# Patient Record
Sex: MALE | Race: WHITE | NOT HISPANIC OR LATINO | Employment: PART TIME | URBAN - METROPOLITAN AREA
[De-identification: names, ages, dates, MRNs, and addresses within clinical notes are randomized per-mention and may not be internally consistent; named-entity substitution may affect disease eponyms.]

---

## 2017-05-28 ENCOUNTER — HOSPITAL ENCOUNTER (EMERGENCY)
Facility: CLINIC | Age: 52
Discharge: HOME | End: 2017-05-28
Attending: FAMILY MEDICINE | Admitting: FAMILY MEDICINE
Payer: COMMERCIAL

## 2017-05-28 VITALS
RESPIRATION RATE: 20 BRPM | TEMPERATURE: 97.7 F | DIASTOLIC BLOOD PRESSURE: 101 MMHG | OXYGEN SATURATION: 98 % | HEART RATE: 82 BPM | SYSTOLIC BLOOD PRESSURE: 144 MMHG

## 2017-05-28 DIAGNOSIS — Z87.19 PERSONAL HISTORY OF DIGESTIVE DISEASE: ICD-10-CM

## 2017-05-28 DIAGNOSIS — R10.32 ABDOMINAL PAIN, LEFT LOWER QUADRANT: ICD-10-CM

## 2017-05-28 DIAGNOSIS — Z87.19 HISTORY OF DIVERTICULITIS: ICD-10-CM

## 2017-05-28 PROCEDURE — 99282 EMERGENCY DEPT VISIT SF MDM: CPT | Performed by: FAMILY MEDICINE

## 2017-05-28 PROCEDURE — 99284 EMERGENCY DEPT VISIT MOD MDM: CPT | Mod: Z6 | Performed by: FAMILY MEDICINE

## 2017-05-28 RX ORDER — CIPROFLOXACIN 500 MG/1
500 TABLET, FILM COATED ORAL 2 TIMES DAILY
Qty: 20 TABLET | Refills: 0 | Status: SHIPPED | OUTPATIENT
Start: 2017-05-28 | End: 2017-06-07

## 2017-05-28 RX ORDER — METRONIDAZOLE 500 MG/1
500 TABLET ORAL 3 TIMES DAILY
Qty: 30 TABLET | Refills: 0 | Status: SHIPPED | OUTPATIENT
Start: 2017-05-28 | End: 2017-06-07

## 2017-05-28 NOTE — ED AVS SNAPSHOT
[facility] Emergency Department    [address] [place] [address]    Phone:  [phone]    Fax:  [phone]                                       Nehemias Dillon   MRN: [id number]    Department:  [facility] Emergency Department   Date of Visit:  5/28/2017           Patient Information     Date Of Birth          1965        Your diagnoses for this visit were:     Abdominal pain, left lower quadrant     History of diverticulitis        You were seen by Trevor Landis MD.      Follow-up Information     Follow up with Luke Rodriguez MD In 3 days.    Specialty:  Family Practice    Why:  if not improving    Contact information:     [facility]  [address] [place] [address] [phone]          Follow up with [facility] Emergency Department.    Specialty:  EMERGENCY MEDICINE    Why:  If symptoms worsen    Contact information:    [address] [place] [address] [phone]    Additional information:    From [address]: Exit at [address] on [place] side of [place]. Turn right on [address]. Turn left at stoplight on [address]. [facility] will be in view two blocks ahead        Discharge Instructions       Thank you for giving us the opportunity to see you. The impression we have from your visit today is that you likely have acute recurrent diverticulitis.  Please see the handout below.    Take Flagyl 500 mg 3 times a day for 10 days, and Cipro 500 mg twice a day for 10 days.    Eat a low fiber/low residue diet.     If you are not seeing an improvement within 2-3 days, please follow up with your primary care provider or clinic.     After discharge, please closely monitor for any new or worsening symptoms. Return to the Emergency Department at any time if your symptoms worsen.        Discharge References/Attachments     DIVERTICULITIS, DISCHARGE INSTRUCTIONS FOR (ENGLISH)      24 Hour Appointment Hotline       To  make an appointment at any [facility] clinic, call [phone] ([phone]). If you don't have a family doctor or clinic, we will help you find one. [facility] clinics are conveniently located to serve the needs of you and your family.             Review of your medicines      START taking        Dose / Directions Last dose taken    ciprofloxacin 500 MG tablet   Commonly known as:  CIPRO   Dose:  500 mg   Quantity:  20 tablet        Take 1 tablet (500 mg) by mouth 2 times daily for 10 days   Refills:  0        metroNIDAZOLE 500 MG tablet   Commonly known as:  FLAGYL   Dose:  500 mg   Quantity:  30 tablet        Take 1 tablet (500 mg) by mouth 3 times daily for 10 days   Refills:  0          Our records show that you are taking the medicines listed below. If these are incorrect, please call your family doctor or clinic.        Dose / Directions Last dose taken    acetaminophen 325 MG tablet   Commonly known as:  TYLENOL   Dose:  650 mg   Quantity:  100 tablet        Take 2 tablets (650 mg) by mouth every 4 hours as needed for mild pain   Refills:  0        ALPRAZolam 0.5 MG tablet   Commonly known as:  XANAX   Dose:  0.5-1 mg   Quantity:  4 tablet        Take 1-2 tablets (0.5-1 mg) by mouth 2 times daily as needed for anxiety   Refills:  1        atorvastatin 20 MG tablet   Commonly known as:  LIPITOR   Dose:  20 mg   Quantity:  30 tablet        Take 1 tablet (20 mg) by mouth daily   Refills:  5        buPROPion 150 MG 12 hr tablet   Commonly known as:  WELLBUTRIN SR   Quantity:  60 tablet        Take 1 tablet once daily and increase to 1 tablet twice daily after 4 to 7 days   Refills:  2        EPINEPHrine 0.3 MG/0.3ML injection   Dose:  0.3 mg   Quantity:  0.6 mL        Inject 0.3 mLs (0.3 mg) into the muscle once as needed for anaphylaxis   Refills:  1        hydrOXYzine 25 MG tablet   Commonly known as:  ATARAX   Dose:  25-50 mg   Quantity:  60 tablet        Take 1-2 tablets (25-50 mg) by mouth every 6 hours  as needed for itching   Refills:  1        penicillin V potassium 500 MG tablet   Commonly known as:  VEETID   Dose:  500 mg   Quantity:  40 tablet        Take 1 tablet (500 mg) by mouth 4 times daily   Refills:  0        sertraline 50 MG tablet   Commonly known as:  ZOLOFT   Dose:  50 mg   Quantity:  90 tablet        Take 1 tablet (50 mg) by mouth daily   Refills:  3                Prescriptions were sent or printed at these locations (2 Prescriptions)                   [facility]   [address] [address]    Telephone:  [phone]   Fax:  [phone]   Hours:                  Printed at Department/Unit printer (1 of 2)         metroNIDAZOLE (FLAGYL) 500 MG tablet                 These medications are not ready yet because we are checking if your insurance will help you pay for them. Call your pharmacy to confirm that your medication is ready for pickup. It may take up to 24 hours for them to receive the prescription. If the prescription is not ready within 3 business days, please contact your clinic or your provider (1 of 2)         ciprofloxacin (CIPRO) 500 MG tablet                Orders Needing Specimen Collection     None      Pending Results     No orders found from 5/26/2017 to 5/29/2017.            Pending Culture Results     No orders found from 5/26/2017 to 5/29/2017.            Pending Results Instructions     If you had any lab results that were not finalized at the time of your Discharge, you can call the ED Lab Result RN at [phone]. You will be contacted by this team for any positive Lab results or changes in treatment. The nurses are available 7 days a week from 10A to 6:30P.  You can leave a message 24 hours per day and they will return your call.        Thank you for choosing [facility]       Thank you for choosing [facility] for your care. Our goal is always to provide you with excellent care. Hearing back from our patients is one way we can  continue to improve our services. Please take a few minutes to complete the written survey that you may receive in the mail after you visit with us. Thank you!        [facility] Information     [facility] gives you secure access to your electronic health record. If you see a primary care provider, you can also send messages to your care team and make appointments. If you have questions, please call your primary care clinic.  If you do not have a primary care provider, please call [phone] and they will assist you.        Care EveryWhere ID     This is your Care EveryWhere ID. This could be used by other organizations to access your [place] medical records  [id number]        After Visit Summary       This is your record. Keep this with you and show to your community pharmacist(s) and doctor(s) at your next visit.

## 2017-05-28 NOTE — ED AVS SNAPSHOT
[facility] Emergency Department    [address] [place] [address]    Phone:  [phone]    Fax:  [phone]                                       Nehemias Dillon   MRN: [id number]    Department:  [facility] Emergency Department   Date of Visit:  5/28/2017           After Visit Summary Signature Page     I have received my discharge instructions, and my questions have been answered. I have discussed any challenges I see with this plan with the nurse or doctor.    ..........................................................................................................................................  Patient/Patient Representative Signature      ..........................................................................................................................................  Patient Representative Print Name and Relationship to Patient    ..................................................               ................................................  Date                                            Time    ..........................................................................................................................................  Reviewed by Signature/Title    ...................................................              ..............................................  Date                                                            Time

## 2017-05-29 NOTE — DISCHARGE INSTRUCTIONS
Thank you for giving us the opportunity to see you. The impression we have from your visit today is that you likely have acute recurrent diverticulitis.  Please see the handout below.    Take Flagyl 500 mg 3 times a day for 10 days, and Cipro 500 mg twice a day for 10 days.    Eat a low fiber/low residue diet.     If you are not seeing an improvement within 2-3 days, please follow up with your primary care provider or clinic.     After discharge, please closely monitor for any new or worsening symptoms. Return to the Emergency Department at any time if your symptoms worsen.

## 2017-05-29 NOTE — ED PROVIDER NOTES
"                                                            [facility] ED Provider Note   CC:     Chief Complaint   Patient presents with     Abdominal Pain     HPI:  Nehemias Dillon is a 52 year old male who presented to the emergency department with wife for abdominal pain. Patient has a history of diverticulitis. He had 12.5 inches of his colon removed. The pain is only located on the left side and has been present since Wednesday. Rates the pain a 4/10 while being in the ED and a 10/10 while he is trying to have a bowel movement. Laying down makes the pain better, but he still has pain while sitting down. It has been steadily worsening since Wednesday. He has only had two episodes of this severe pain since surgery which was in 2014. He can tell when he is going to get backed up. He had more bowel movements today and was relieved after one of them; but knew it was probably because he had an infection. He states that it was dark on one side and \"ribbon-like\" on the other side. He has been eating more fruit which believes is why he has an excess of gas. Last night he was running a low-grade fever. He's very understanding and knows what to do when he is in this amount of pain. Has had plenty of fluids. Would like a 10 day antibiotic prescription since he has had good results with that in the past.    Problem List:  Patient Active Problem List    Diagnosis     Diverticulitis of sigmoid colon     Acute chest pain     Tobacco use disorder     Inguinal hernia     Anxiety     Hidradenitis suppurativa     Suppurative hidradenitis     Diverticulitis of colon     Hyperlipidemia LDL goal <130     Allergic to bees     Obstructive sleep apnea     Carpal tunnel syndrome     Sleep disorder     Obesity     Pure hyperglyceridemia     Other and unspecified derangement of medial meniscus       MEDS:   Discharge Medication List as of 5/28/2017  9:48 PM      CONTINUE these medications which have NOT CHANGED    Details "   atorvastatin (LIPITOR) 20 MG tablet Take 1 tablet (20 mg) by mouth daily, Disp-30 tablet, R-5, Fax      penicillin V potassium (VEETID) 500 MG tablet Take 1 tablet (500 mg) by mouth 4 times daily, Disp-40 tablet, R-0, E-Prescribe      sertraline (ZOLOFT) 50 MG tablet Take 1 tablet (50 mg) by mouth daily, Disp-90 tablet, R-3, E-Prescribe      ALPRAZolam (XANAX) 0.5 MG tablet Take 1-2 tablets (0.5-1 mg) by mouth 2 times daily as needed for anxiety, Disp-4 tablet, R-1, Local Print      acetaminophen (TYLENOL) 325 MG tablet Take 2 tablets (650 mg) by mouth every 4 hours as needed for mild pain, Disp-100 tablet, Historical      buPROPion (WELLBUTRIN SR) 150 MG 12 hr tablet Take 1 tablet once daily and increase to 1 tablet twice daily after 4 to 7 days, Disp-60 tablet, R-2, Fax      hydrOXYzine (ATARAX) 25 MG tablet Take 1-2 tablets (25-50 mg) by mouth every 6 hours as needed for itching, Disp-60 tablet, R-1, E-Prescribe      EPINEPHrine (EPIPEN) 0.3 MG/0.3ML injection Inject 0.3 mLs (0.3 mg) into the muscle once as needed for anaphylaxis, Disp-0.6 mL, R-1, E-Prescribe             ALLERGIES:    Allergies   Allergen Reactions     Bees [Bees]      Nkda [No Known Drug Allergies]        Past medical, surgical, and social histories, triage and nursing notes were all reviewed.    Review of Systems   All other systems were reviewed and are negative    Physical Exam     Vitals were reviewed  Patient Vitals for the past 8 hrs:   BP Temp Temp src Pulse Resp SpO2   05/28/17 2120 (!) 144/101 97.7  F (36.5  C) Temporal 82 20 98 %     GENERAL APPEARANCE: calm, no acute distress, pleasant male  FACE: normal facies  EYES: Pupils are equal  HENT: normal external exam  NECK: no adenopathy or asymmetry  RESP: normal respiratory effort; clear breath sounds bilaterally  CV: regular rate and rhythm; no significant murmurs, gallops or rubs; DP and PT pulses +2  ABD: LLQ tenderness; soft, no rebound or guarding; bowel sounds are normal  MS: no  gross deformities noted; normal muscle tone.  EXT: No calf tenderness or pitting edema  SKIN: no worrisome rash  NEURO: no facial droop; no focal deficits, speech is normal        Available Lab/Imaging Results   No results found for this or any previous visit (from the past 24 hour(s)).      Impression     Final diagnoses:   Abdominal pain, left lower quadrant   History of diverticulitis       ED Course & Medical Decision Making   Nehemias Dillon is a 52 year old male who presented to the emergency department with onset of left lower quadrant abdominal pain since Wednesday, approximately 5 days ago.  Initially the patient started to take more fiber since he thought he was constipated.  In the past when he has had acute diverticulitis, symptoms 1st began with the constipation.  He started to eat lots of fruits, and started to have a bowel movement last night and today.  His pain was worse today, and it is more intense after having a bowel movement.  He has had several more bowel movements today that are abnormal in shape.  He describes a ribbon-like shape and flat small stools.  There is been no blood although there has been some stools that are dark in color.  He has had diverticulitis even since he had a partial resection 3-4 years ago for diverticulitis.  He does not identify any triggers.  Patient reports low-grade temp of 99.3 the last 2 nights.  He does not have chills, night sweats and does not report any nausea or vomiting.  He is still eating a regular diet.  Patient is accompanied by his wife.  Patient was offered the routine workup including CBC and CT scan.  He states that he is 100% certain that he has a recurrent episode of acute diverticulitis.  Patient would prefer to start antibiotics and promises to return in 2-3 days if he is not better.  He was instructed to begin a low residue diet.  He did not want any pain medication.  Follow-up or return in 2-3 days if not improving, and sooner if symptoms  worsen.      Written after-visit summary and instructions were given at the time of discharge.      Discharge Medication List as of 5/28/2017  9:48 PM      START taking these medications    Details   metroNIDAZOLE (FLAGYL) 500 MG tablet Take 1 tablet (500 mg) by mouth 3 times daily for 10 days, Disp-30 tablet, R-0, Local Print      ciprofloxacin (CIPRO) 500 MG tablet Take 1 tablet (500 mg) by mouth 2 times daily for 10 days, Disp-20 tablet, R-0, E-Prescribe           This document serves as a record of services personally performed by Treovr Landis MD. It was created on their behalf by Constance Oliveira, a trained medical scribe. The creation of this record is based on the provider's personal observations and the statements of the patient. This document has been checked and approved by the attending provider.        This note was completed in part using Dragon voice recognition, and may contain word and grammatical errors.        Trevor Landis MD  05/28/17 0580

## 2017-06-11 DIAGNOSIS — E78.5 HYPERLIPIDEMIA LDL GOAL <130: ICD-10-CM

## 2017-06-12 NOTE — TELEPHONE ENCOUNTER
atorvastatin (LIPITOR) 20 MG tablet     Last Written Prescription Date: 11/29/16  Last Fill Quantity: 30, # refills: 5  Last Office Visit with [facility], P or [facility] prescribing provider: 10/3/16 OFELIA       Lab Results   Component Value Date    CHOL 130 09/06/2016     Lab Results   Component Value Date    HDL 24 09/06/2016     Lab Results   Component Value Date    LDL 67 09/06/2016     Lab Results   Component Value Date    TRIG 197 09/06/2016     Lab Results   Component Value Date    CHOLHDLRATIO 7.0 06/15/2012

## 2017-06-13 RX ORDER — ATORVASTATIN CALCIUM 20 MG/1
TABLET, FILM COATED ORAL
Qty: 30 TABLET | Refills: 2 | Status: SHIPPED | OUTPATIENT
Start: 2017-06-13 | End: 2017-10-31

## 2017-06-13 NOTE — TELEPHONE ENCOUNTER
Prescription approved per [facility] Refill Protocol.  Dianna Griffiths, RN, BSN

## 2017-10-27 NOTE — PROGRESS NOTES
SUBJECTIVE:   CC: Nehemias Dillon is an 52 year old male who presents for preventative health visit.     Physical   Annual:     Getting at least 3 servings of Calcium per day::  Yes    Bi-annual eye exam::  Yes    Dental care twice a year::  Yes    Sleep apnea or symptoms of sleep apnea::  Sleep apnea    Diet::  Regular (no restrictions)    Frequency of exercise::  None    Taking medications regularly::  Yes    Medication side effects::  None    Additional concerns today::  YES (General surg referral, Derm referral)              Today's PHQ-2 Score: PHQ-2 ( 1999 Pfizer) 10/3/2016   Q1: Little interest or pleasure in doing things 0   Q2: Feeling down, depressed or hopeless 0   PHQ-2 Score 0       Abuse: Current or Past(Physical, Sexual or Emotional)- No  Do you feel safe in your environment - Yes    Social History   Substance Use Topics     Smoking status: Current Every Day Smoker     Packs/day: 1.50     Years: 20.00     Types: Cigarettes     Smokeless tobacco: Never Used      Comment: smokes 1 pack daily again since 6-09     Alcohol use Yes      Comment: one drink rarely     The patient does not drink >3 drinks per day nor >7 drinks per week.    Last PSA: No results found for: PSA    Reviewed orders with patient. Reviewed health maintenance and updated orders accordingly - Yes  BP Readings from Last 3 Encounters:   10/31/17 136/80   05/28/17 (!) 144/101   11/22/16 141/86    Wt Readings from Last 3 Encounters:   10/31/17 252 lb (114.3 kg)   10/03/16 228 lb 6.4 oz (103.6 kg)   09/06/16 232 lb 3.2 oz (105.3 kg)                  Patient Active Problem List   Diagnosis     Other and unspecified derangement of medial meniscus     Pure hyperglyceridemia     Carpal tunnel syndrome     Sleep disorder     Obesity     Obstructive sleep apnea     Allergic to bees     Anxiety     Hyperlipidemia LDL goal <130     Diverticulitis of sigmoid colon     Inguinal hernia     Diverticulitis of colon     Hidradenitis suppurativa      Suppurative hidradenitis     Acute chest pain     Tobacco use disorder     History of diverticulosis     External hemorrhoids     H/O partial resection of colon     Sebaceous cyst     TORI (obstructive sleep apnea)     Inguinal hernia, left     Seasonal allergic rhinitis, unspecified chronicity, unspecified trigger     Past Surgical History:   Procedure Laterality Date     ARTHROPLASTY SHOULDER      right     CARPAL TUNNEL RELEASE RT/LT  2012    left     HC KNEE SCOPE,MED/LAT MENISECTOMY  07/08/2002    Right knee arthroscopy, lateral menisectomy     HC REPAIR ING HERNIA,5+Y/O,REDUCIBL  1997    Marlex mesh repair of right indirect inguinal hernia     SIGMOIDECTOMY         Social History   Substance Use Topics     Smoking status: Current Every Day Smoker     Packs/day: 1.50     Years: 20.00     Types: Cigarettes     Smokeless tobacco: Never Used      Comment: smokes 1 pack daily again since 6-09     Alcohol use Yes      Comment: one drink rarely     Family History   Problem Relation Age of Onset     Adopted: Yes     Unknown/Adopted Mother      Unknown/Adopted Father      Unknown/Adopted Maternal Grandmother      Unknown/Adopted Maternal Grandfather      Unknown/Adopted Paternal Grandmother      Unknown/Adopted Paternal Grandfather      Unknown/Adopted Brother      Unknown/Adopted Sister          Current Outpatient Prescriptions   Medication Sig Dispense Refill     atorvastatin (LIPITOR) 20 MG tablet Take 1 tablet (20 mg) by mouth daily 90 tablet 1     hydrOXYzine (ATARAX) 25 MG tablet Take 1-2 tablets (25-50 mg) by mouth every 6 hours as needed for anxiety 60 tablet 1     cetirizine (ZYRTEC) 10 MG tablet Take 1 tablet (10 mg) by mouth every evening 90 tablet 1     fluticasone (FLONASE) 50 MCG/ACT spray Spray 1-2 sprays into both nostrils daily 3 Bottle 1     EPINEPHrine (EPIPEN/ADRENACLICK/OR ANY BX GENERIC EQUIV) 0.3 MG/0.3ML injection 2-pack Inject 0.3 mLs (0.3 mg) into the muscle once as needed for anaphylaxis 0.6  mL 1     [DISCONTINUED] atorvastatin (LIPITOR) 20 MG tablet TAKE ONE TABLET BY MOUTH EVERY DAY (Patient not taking: Reported on 10/31/2017) 30 tablet 2     penicillin V potassium (VEETID) 500 MG tablet Take 1 tablet (500 mg) by mouth 4 times daily (Patient not taking: Reported on 10/31/2017) 40 tablet 0     sertraline (ZOLOFT) 50 MG tablet Take 1 tablet (50 mg) by mouth daily (Patient not taking: Reported on 10/31/2017) 90 tablet 3     acetaminophen (TYLENOL) 325 MG tablet Take 2 tablets (650 mg) by mouth every 4 hours as needed for mild pain 100 tablet      buPROPion (WELLBUTRIN SR) 150 MG 12 hr tablet Take 1 tablet once daily and increase to 1 tablet twice daily after 4 to 7 days (Patient not taking: Reported on 10/31/2017) 60 tablet 2     Allergies   Allergen Reactions     Bees [Bees]      Nkda [No Known Drug Allergies]            Reviewed and updated as needed this visit by clinical staff         Reviewed and updated as needed this visit by Provider        Past Medical History:   Diagnosis Date     Diverticulitis      DM type 2 (diabetes mellitus, type 2) (H)     Diet resolved DM and diagnosis eliminated from problem list  3/2013     Hypertriglyceridemia      Lumbago     Hx of injury with low back pain & radiculitis, resolved with conservative therapy     TORI (obstructive sleep apnea) 10/31/2017     Prostatitis, unspecified      Unspecified closed fracture of ankle     Ankle fracture      Past Surgical History:   Procedure Laterality Date     ARTHROPLASTY SHOULDER      right     CARPAL TUNNEL RELEASE RT/LT  2012    left     HC KNEE SCOPE,MED/LAT MENISECTOMY  07/08/2002    Right knee arthroscopy, lateral menisectomy     HC REPAIR ING HERNIA,5+Y/O,REDUCIBL  1997    Marlex mesh repair of right indirect inguinal hernia     SIGMOIDECTOMY     Review of Systems  C: NEGATIVE for fever, chills, change in weight  I: NEGATIVE for worrisome rashes, moles or lesions  E: NEGATIVE for vision changes or irritation  ENT:  NEGATIVE for ear, mouth and throat problems  R: NEGATIVE for significant cough or SOB  CV: NEGATIVE for chest pain, palpitations or peripheral edema  GI: NEGATIVE for nausea, abdominal pain, heartburn, or change in bowel habits   male: negative for dysuria, hematuria, decreased urinary stream, erectile dysfunction, urethral discharge  M: NEGATIVE for significant arthralgias or myalgia  N: NEGATIVE for weakness, dizziness or paresthesias  P: NEGATIVE for changes in mood or affect    OBJECTIVE:   There were no vitals taken for this visit.    Physical Exam  GENERAL: healthy, alert and no distress  EYES: Eyes grossly normal to inspection, PERRL and conjunctivae and sclerae normal  HENT: ear canals and TM's normal, nose and mouth without ulcers or lesions  NECK: no adenopathy, no asymmetry, masses, or scars and thyroid normal to palpation  RESP: lungs clear to auscultation - no rales, rhonchi or wheezes  CV: regular rate and rhythm, normal S1 S2, no S3 or S4, no murmur, click or rub, no peripheral edema and peripheral pulses strong  ABDOMEN: soft, nontender, no hepatosplenomegaly, no masses and bowel sounds normal  MS: no gross musculoskeletal defects noted, no edema  SKIN: Evidence of hidradenitis suprapubic to the posterior aspect of the neck is noted today. Several small sebaceous cysts are present upon exam.  NEURO: Normal strength and tone, mentation intact and speech normal  PSYCH: mentation appears normal, affect normal/bright  GENITALIA: - Exam was declined by the patient today. His overall report is that of a left-sided inguinal hernia that comes and goes. He states that he has some external hemorrhoids that he would like looked at by the surgeon.    ASSESSMENT/PLAN:   1. Hyperlipidemia LDL goal <130  - atorvastatin (LIPITOR) 20 MG tablet; Take 1 tablet (20 mg) by mouth daily  Dispense: 90 tablet; Refill: 1    2. Hidradenitis suppurativa  no    3. Sebaceous cyst  - GENERAL SURG ADULT REFERRAL    4. External  hemorrhoids  - GENERAL SURG ADULT REFERRAL    5. Routine general medical examination at a health care facility  - CBC with platelets  - Comprehensive metabolic panel  - Lipid panel reflex to direct LDL Fasting  - PSA, screen  - GENERAL SURG ADULT REFERRAL  - Hemoglobin A1c    6. H/O partial resection of colon  - Hemoglobin A1c    7. History of diverticulosis  - Hemoglobin A1c    8. Anxiety  - hydrOXYzine (ATARAX) 25 MG tablet; Take 1-2 tablets (25-50 mg) by mouth every 6 hours as needed for anxiety  Dispense: 60 tablet; Refill: 1    9. Inguinal hernia, left  - GENERAL SURG ADULT REFERRAL    10. Seasonal allergic rhinitis, unspecified chronicity, unspecified trigger  - cetirizine (ZYRTEC) 10 MG tablet; Take 1 tablet (10 mg) by mouth every evening  Dispense: 90 tablet; Refill: 1  - fluticasone (FLONASE) 50 MCG/ACT spray; Spray 1-2 sprays into both nostrils daily  Dispense: 3 Bottle; Refill: 1    11. Tobacco use disorder   advised cessation he states that he will continue to try.    12. H/O bee sting allergy  13. Allergic to bees  Refilled medications as directed  - EPINEPHrine (EPIPEN/ADRENACLICK/OR ANY BX GENERIC EQUIV) 0.3 MG/0.3ML injection 2-pack; Inject 0.3 mLs (0.3 mg) into the muscle once as needed for anaphylaxis  Dispense: 0.6 mL; Refill: 1    COUNSELING:   Reviewed preventive health counseling, as reflected in patient instructions       Regular exercise       Healthy diet/nutrition       Vision screening       Hearing screening    BP Screening:   Last 3 BP Readings:    BP Readings from Last 3 Encounters:   10/31/17 136/80   05/28/17 (!) 144/101   11/22/16 141/86       The following was recommended to the patient:  Re-screen within 4 weeks and recommend lifestyle modifications       reports that he has been smoking Cigarettes.  He has a 30.00 pack-year smoking history. He has never used smokeless tobacco.  Tobacco Cessation Action Plan: Information offered: Patient not interested at this time  Estimated body  "mass index is 36.86 kg/(m^2) as calculated from the following:    Height as of 8/29/16: 5' 6\" (1.676 m).    Weight as of 10/3/16: 228 lb 6.4 oz (103.6 kg).   Weight management plan: Patient was referred to their PCP to discuss a diet and exercise plan.    Counseling Resources:  ATP IV Guidelines  Pooled Cohorts Equation Calculator  FRAX Risk Assessment  ICSI Preventive Guidelines  Dietary Guidelines for Americans, 2010  USDA's MyPlate  ASA Prophylaxis  Lung CA Screening    Nj Aponte PA-C  [facility]  Answers for HPI/ROS submitted by the patient on 10/31/2017   PHQ-2 Score: 1    "

## 2017-10-31 ENCOUNTER — OFFICE VISIT (OUTPATIENT)
Dept: FAMILY MEDICINE | Facility: OTHER | Age: 52
End: 2017-10-31
Payer: COMMERCIAL

## 2017-10-31 VITALS
HEIGHT: 67 IN | SYSTOLIC BLOOD PRESSURE: 136 MMHG | HEART RATE: 84 BPM | WEIGHT: 252 LBS | DIASTOLIC BLOOD PRESSURE: 80 MMHG | RESPIRATION RATE: 18 BRPM | TEMPERATURE: 98.5 F | BODY MASS INDEX: 39.55 KG/M2

## 2017-10-31 DIAGNOSIS — Z00.00 ROUTINE GENERAL MEDICAL EXAMINATION AT A HEALTH CARE FACILITY: Primary | ICD-10-CM

## 2017-10-31 DIAGNOSIS — Z91.030 H/O BEE STING ALLERGY: ICD-10-CM

## 2017-10-31 DIAGNOSIS — Z91.030 ALLERGIC TO BEES: ICD-10-CM

## 2017-10-31 DIAGNOSIS — F41.9 ANXIETY: ICD-10-CM

## 2017-10-31 DIAGNOSIS — J30.2 SEASONAL ALLERGIC RHINITIS, UNSPECIFIED CHRONICITY, UNSPECIFIED TRIGGER: ICD-10-CM

## 2017-10-31 DIAGNOSIS — K40.90 INGUINAL HERNIA, LEFT: ICD-10-CM

## 2017-10-31 DIAGNOSIS — L72.3 SEBACEOUS CYST: ICD-10-CM

## 2017-10-31 DIAGNOSIS — K64.4 EXTERNAL HEMORRHOIDS: ICD-10-CM

## 2017-10-31 DIAGNOSIS — F17.200 TOBACCO USE DISORDER: ICD-10-CM

## 2017-10-31 DIAGNOSIS — Z90.49 H/O PARTIAL RESECTION OF COLON: ICD-10-CM

## 2017-10-31 DIAGNOSIS — L73.2 HIDRADENITIS SUPPURATIVA: ICD-10-CM

## 2017-10-31 DIAGNOSIS — Z87.19 HISTORY OF DIVERTICULOSIS: ICD-10-CM

## 2017-10-31 DIAGNOSIS — E78.5 HYPERLIPIDEMIA LDL GOAL <130: ICD-10-CM

## 2017-10-31 PROBLEM — G47.33 OSA (OBSTRUCTIVE SLEEP APNEA): Status: ACTIVE | Noted: 2017-10-31

## 2017-10-31 LAB
ALBUMIN SERPL-MCNC: 4 G/DL (ref 3.4–5)
ALP SERPL-CCNC: 83 U/L (ref 40–150)
ALT SERPL W P-5'-P-CCNC: 39 U/L (ref 0–70)
ANION GAP SERPL CALCULATED.3IONS-SCNC: 8 MMOL/L (ref 3–14)
AST SERPL W P-5'-P-CCNC: 29 U/L (ref 0–45)
BILIRUB SERPL-MCNC: 0.8 MG/DL (ref 0.2–1.3)
BUN SERPL-MCNC: 13 MG/DL (ref 7–30)
CALCIUM SERPL-MCNC: 8.7 MG/DL (ref 8.5–10.1)
CHLORIDE SERPL-SCNC: 103 MMOL/L (ref 94–109)
CHOLEST SERPL-MCNC: 181 MG/DL
CO2 SERPL-SCNC: 26 MMOL/L (ref 20–32)
CREAT SERPL-MCNC: 0.87 MG/DL (ref 0.66–1.25)
ERYTHROCYTE [DISTWIDTH] IN BLOOD BY AUTOMATED COUNT: 13.5 % (ref 10–15)
GFR SERPL CREATININE-BSD FRML MDRD: >90 ML/MIN/1.7M2
GLUCOSE SERPL-MCNC: 94 MG/DL (ref 70–99)
HBA1C MFR BLD: 5.8 % (ref 4.3–6)
HCT VFR BLD AUTO: 45.5 % (ref 40–53)
HDLC SERPL-MCNC: 25 MG/DL
HGB BLD-MCNC: 15.8 G/DL (ref 13.3–17.7)
LDLC SERPL CALC-MCNC: 110 MG/DL
MCH RBC QN AUTO: 31.2 PG (ref 26.5–33)
MCHC RBC AUTO-ENTMCNC: 34.7 G/DL (ref 31.5–36.5)
MCV RBC AUTO: 90 FL (ref 78–100)
NONHDLC SERPL-MCNC: 156 MG/DL
PLATELET # BLD AUTO: 254 10E9/L (ref 150–450)
POTASSIUM SERPL-SCNC: 4.3 MMOL/L (ref 3.4–5.3)
PROT SERPL-MCNC: 7.6 G/DL (ref 6.8–8.8)
PSA SERPL-ACNC: 0.84 UG/L (ref 0–4)
RBC # BLD AUTO: 5.06 10E12/L (ref 4.4–5.9)
SODIUM SERPL-SCNC: 137 MMOL/L (ref 133–144)
TRIGL SERPL-MCNC: 229 MG/DL
WBC # BLD AUTO: 9 10E9/L (ref 4–11)

## 2017-10-31 PROCEDURE — 99396 PREV VISIT EST AGE 40-64: CPT | Performed by: PHYSICIAN ASSISTANT

## 2017-10-31 PROCEDURE — 80061 LIPID PANEL: CPT | Performed by: PHYSICIAN ASSISTANT

## 2017-10-31 PROCEDURE — G0103 PSA SCREENING: HCPCS | Performed by: PHYSICIAN ASSISTANT

## 2017-10-31 PROCEDURE — 83036 HEMOGLOBIN GLYCOSYLATED A1C: CPT | Performed by: PHYSICIAN ASSISTANT

## 2017-10-31 PROCEDURE — 85027 COMPLETE CBC AUTOMATED: CPT | Performed by: PHYSICIAN ASSISTANT

## 2017-10-31 PROCEDURE — 36415 COLL VENOUS BLD VENIPUNCTURE: CPT | Performed by: PHYSICIAN ASSISTANT

## 2017-10-31 PROCEDURE — 80053 COMPREHEN METABOLIC PANEL: CPT | Performed by: PHYSICIAN ASSISTANT

## 2017-10-31 RX ORDER — HYDROXYZINE HYDROCHLORIDE 25 MG/1
25-50 TABLET, FILM COATED ORAL EVERY 6 HOURS PRN
Qty: 60 TABLET | Refills: 1 | Status: SHIPPED | OUTPATIENT
Start: 2017-10-31 | End: 2017-12-11

## 2017-10-31 RX ORDER — CETIRIZINE HYDROCHLORIDE 10 MG/1
10 TABLET ORAL EVERY EVENING
Qty: 90 TABLET | Refills: 1 | Status: SHIPPED | OUTPATIENT
Start: 2017-10-31 | End: 2017-12-11

## 2017-10-31 RX ORDER — ATORVASTATIN CALCIUM 20 MG/1
20 TABLET, FILM COATED ORAL DAILY
Qty: 90 TABLET | Refills: 1 | Status: SHIPPED | OUTPATIENT
Start: 2017-10-31

## 2017-10-31 RX ORDER — EPINEPHRINE 0.3 MG/.3ML
0.3 INJECTION SUBCUTANEOUS
Qty: 0.6 ML | Refills: 1 | Status: SHIPPED | OUTPATIENT
Start: 2017-10-31 | End: 2017-12-11

## 2017-10-31 RX ORDER — FLUTICASONE PROPIONATE 50 MCG
1-2 SPRAY, SUSPENSION (ML) NASAL DAILY
Qty: 3 BOTTLE | Refills: 1 | Status: SHIPPED | OUTPATIENT
Start: 2017-10-31 | End: 2017-12-11

## 2017-10-31 ASSESSMENT — PAIN SCALES - GENERAL: PAINLEVEL: NO PAIN (0)

## 2017-10-31 NOTE — NURSING NOTE
"Chief Complaint   Patient presents with     Physical     Panel Management     Tobacco cessation, flu, lipid       Initial /80 (Cuff Size: Adult Large)  Pulse 84  Temp 98.5  F (36.9  C) (Temporal)  Resp 18  Ht 5' 7\" (1.702 m)  Wt 252 lb (114.3 kg)  BMI 39.47 kg/m2 Estimated body mass index is 39.47 kg/(m^2) as calculated from the following:    Height as of this encounter: 5' 7\" (1.702 m).    Weight as of this encounter: 252 lb (114.3 kg).  Medication Reconciliation: complete   Camrel Guerra CMA (AAMA)    "

## 2017-10-31 NOTE — MR AVS SNAPSHOT
After Visit Summary   10/31/2017    Nehemias Dillon    MRN: [id number]           Patient Information     Date Of Birth          1965        Visit Information        Provider Department      10/31/2017 4:00 PM Nj De La Garza PA-C [facility]        Today's Diagnoses     Routine general medical examination at a health care facility    -  1    Hyperlipidemia LDL goal <130        Hidradenitis suppurativa        Sebaceous cyst        External hemorrhoids        H/O partial resection of colon        History of diverticulosis        Anxiety        Inguinal hernia, left        Seasonal allergic rhinitis, unspecified chronicity, unspecified trigger        Tobacco use disorder        H/O bee sting allergy        Allergic to bees          Care Instructions      Preventive Health Recommendations  Male Ages 50 - 64    Yearly exam:             See your health care provider every year in order to  o   Review health changes.   o   Discuss preventive care.    o   Review your medicines if your doctor has prescribed any.     Have a cholesterol test every 5 years, or more frequently if you are at risk for high cholesterol/heart disease.     Have a diabetes test (fasting glucose) every three years. If you are at risk for diabetes, you should have this test more often.     Have a colonoscopy at age 50, or have a yearly FIT test (stool test). These exams will check for colon cancer.      Talk with your health care provider about whether or not a prostate cancer screening test (PSA) is right for you.    You should be tested each year for STDs (sexually transmitted diseases), if you re at risk.     Shots: Get a flu shot each year. Get a tetanus shot every 10 years.     Nutrition:    Eat at least 5 servings of fruits and vegetables daily.     Eat whole-grain bread, whole-wheat pasta and brown rice instead of white grains and rice.     Talk to your provider about Calcium and Vitamin D.     Lifestyle    Exercise  for at least 150 minutes a week (30 minutes a day, 5 days a week). This will help you control your weight and prevent disease.     Limit alcohol to one drink per day.     No smoking.     Wear sunscreen to prevent skin cancer.     See your dentist every six months for an exam and cleaning.     See your eye doctor every 1 to 2 years.            Follow-ups after your visit        Additional Services     GENERAL SURG ADULT REFERRAL       Your provider has referred you to: FMG: [facility] [phone]   [email or web address]  FMG: [facility] [phone]   [email or web address]  FMG: [facility] [phone]   [email or web address]  FM[facility]: [facility] [phone]   [email or web address]    Please be aware that coverage of these services is subject to the terms and limitations of your health insurance plan.  Call member services at your health plan with any benefit or coverage questions.      Please bring the following with you to your appointment:    (1) Any X-Rays, CTs or MRIs which have been performed.  Contact the facility where they were done to arrange for  prior to your scheduled appointment.   (2) List of current medications   (3) This referral request   (4) Any documents/labs given to you for this referral                  Who to contact     If you have questions or need follow up information about today's clinic visit or your schedule please contact [facility] directly at [phone].  Normal or non-critical lab and imaging results will be communicated to you by MyChart, letter or phone within 4 business days after the clinic has received the results. If you do not hear from us within 7 days, please contact the clinic through MyChart or phone. If you have a  "critical or abnormal lab result, we will notify you by phone as soon as possible.  Submit refill requests through [employer] or call your pharmacy and they will forward the refill request to us. Please allow 3 business days for your refill to be completed.          Additional Information About Your Visit        [facility] Information     [employer] gives you secure access to your electronic health record. If you see a primary care provider, you can also send messages to your care team and make appointments. If you have questions, please call your primary care clinic.  If you do not have a primary care provider, please call [phone] and they will assist you.        Care EveryWhere ID     This is your Care EveryWhere ID. This could be used by other organizations to access your [place] medical records  [id number]        Your Vitals Were     Pulse Temperature Respirations Height BMI (Body Mass Index)       84 98.5  F (36.9  C) (Temporal) 18 5' 7\" (1.702 m) 39.47 kg/m2        Blood Pressure from Last 3 Encounters:   10/31/17 136/80   05/28/17 (!) 144/101   11/22/16 141/86    Weight from Last 3 Encounters:   10/31/17 252 lb (114.3 kg)   10/03/16 228 lb 6.4 oz (103.6 kg)   09/06/16 232 lb 3.2 oz (105.3 kg)              We Performed the Following     CBC with platelets     Comprehensive metabolic panel     GENERAL SURG ADULT REFERRAL     Hemoglobin A1c     Lipid panel reflex to direct LDL Fasting     PSA, screen          Today's Medication Changes          These changes are accurate as of: 10/31/17  4:42 PM.  If you have any questions, ask your nurse or doctor.               Start taking these medicines.        Dose/Directions    cetirizine 10 MG tablet   Commonly known as:  zyrTEC   Used for:  Seasonal allergic rhinitis, unspecified chronicity, unspecified trigger   Started by:  Nj De La Garza PA-C        Dose:  10 mg   Take 1 tablet (10 mg) by mouth every evening   Quantity:  90 tablet   Refills:  1       fluticasone 50 " MCG/ACT spray   Commonly known as:  FLONASE   Used for:  Seasonal allergic rhinitis, unspecified chronicity, unspecified trigger   Started by:  Nj De La Garza PA-C        Dose:  1-2 spray   Spray 1-2 sprays into both nostrils daily   Quantity:  3 Bottle   Refills:  1         These medicines have changed or have updated prescriptions.        Dose/Directions    atorvastatin 20 MG tablet   Commonly known as:  LIPITOR   This may have changed:  See the new instructions.   Used for:  Hyperlipidemia LDL goal <130   Changed by:  Nj De La Garza PA-C        Dose:  20 mg   Take 1 tablet (20 mg) by mouth daily   Quantity:  90 tablet   Refills:  1       hydrOXYzine 25 MG tablet   Commonly known as:  ATARAX   This may have changed:  reasons to take this   Used for:  Anxiety   Changed by:  Nj De La Garza PA-C        Dose:  25-50 mg   Take 1-2 tablets (25-50 mg) by mouth every 6 hours as needed for anxiety   Quantity:  60 tablet   Refills:  1         Stop taking these medicines if you haven't already. Please contact your care team if you have questions.     ALPRAZolam 0.5 MG tablet   Commonly known as:  XANAX   Stopped by:  Nj De La Garza PA-C                Where to get your medicines      These medications were sent to [facility] - [address]   [address] [place] [address]     Phone:  [phone]     atorvastatin 20 MG tablet    cetirizine 10 MG tablet    fluticasone 50 MCG/ACT spray    hydrOXYzine 25 MG tablet         Call your pharmacy to confirm that your medication is ready for pickup. It may take up to 24 hours for them to receive the prescription. If the prescription is not ready within 3 business days, please contact your clinic or your provider.     We will let you know when these medications are ready. If you don't hear back within 3 business days, please contact us.     EPINEPHrine 0.3 MG/0.3ML injection 2-pack                Primary Care Provider Office Phone # Fax #     Luke Rodriguez [phone]       [address] [place] [address]        Equal Access to Services     ROSALEE STRONG : Hadcory aad ku hadbrandiebear Marko, wakerryda savannajulioha, lilota kacarmine wagoner, shannan fuller andreasherlyn lovettsusannah rhys. So [facility] [phone].    ATENCIÓN: Si habla español, tiene a anthony disposición servicios gratuitos de asistencia lingüística. Llame al [phone].    We comply with applicable federal civil rights laws and [place] laws. We do not discriminate on the basis of race, color, national origin, age, disability, sex, sexual orientation, or gender identity.            Thank you!     Thank you for choosing [facility]  for your care. Our goal is always to provide you with excellent care. Hearing back from our patients is one way we can continue to improve our services. Please take a few minutes to complete the written survey that you may receive in the mail after your visit with us. Thank you!             Your Updated Medication List - Protect others around you: Learn how to safely use, store and throw away your medicines at www.disposemymeds.org.          This list is accurate as of: 10/31/17  4:42 PM.  Always use your most recent med list.                   Brand Name Dispense Instructions for use Diagnosis    acetaminophen 325 MG tablet    TYLENOL    100 tablet    Take 2 tablets (650 mg) by mouth every 4 hours as needed for mild pain        atorvastatin 20 MG tablet    LIPITOR    90 tablet    Take 1 tablet (20 mg) by mouth daily    Hyperlipidemia LDL goal <130       buPROPion 150 MG 12 hr tablet    WELLBUTRIN SR    60 tablet    Take 1 tablet once daily and increase to 1 tablet twice daily after 4 to 7 days    Anxiety, Tobacco use disorder       cetirizine 10 MG tablet    zyrTEC    90 tablet    Take 1 tablet (10 mg) by mouth every evening    Seasonal allergic rhinitis, unspecified chronicity, unspecified trigger       EPINEPHrine 0.3 MG/0.3ML  injection 2-pack    EPIPEN/ADRENACLICK/or ANY BX GENERIC EQUIV    0.6 mL    Inject 0.3 mLs (0.3 mg) into the muscle once as needed for anaphylaxis    H/O bee sting allergy       fluticasone 50 MCG/ACT spray    FLONASE    3 Bottle    Spray 1-2 sprays into both nostrils daily    Seasonal allergic rhinitis, unspecified chronicity, unspecified trigger       hydrOXYzine 25 MG tablet    ATARAX    60 tablet    Take 1-2 tablets (25-50 mg) by mouth every 6 hours as needed for anxiety    Anxiety       penicillin V potassium 500 MG tablet    VEETID    40 tablet    Take 1 tablet (500 mg) by mouth 4 times daily    Throat pain       sertraline 50 MG tablet    ZOLOFT    90 tablet    Take 1 tablet (50 mg) by mouth daily    Anxiety

## 2017-11-24 ENCOUNTER — TELEPHONE (OUTPATIENT)
Dept: FAMILY MEDICINE | Facility: OTHER | Age: 52
End: 2017-11-24

## 2017-11-24 NOTE — LETTER
[facility]  [address] [place] [address]  [place] [address] [phone]          November 24, 2017    Nehemias Dillon                                                                                                                     [address] [place] [address]            Dear Nehemias,    We recently noticed that your provider had referred you to General Surgery and you haven't scheduled yet. Please call [phone] to schedule your appointment. If you have questions, concerns or no longer need your appointment please call [phone].          Sincerely,         Nj Aponte PA-C

## 2017-11-24 NOTE — TELEPHONE ENCOUNTER
You placed a referral for patient to general surgery on 10/31/17.  Patient has not scheduled as of yet.      Please review and forward to team if follow up with the patient is needed.     Thank you!  Ivy/Clinic Referrals Dyad II

## 2017-12-11 ENCOUNTER — OFFICE VISIT (OUTPATIENT)
Dept: SURGERY | Facility: OTHER | Age: 52
End: 2017-12-11
Attending: PHYSICIAN ASSISTANT
Payer: COMMERCIAL

## 2017-12-11 ENCOUNTER — ANESTHESIA EVENT (OUTPATIENT)
Dept: GASTROENTEROLOGY | Facility: CLINIC | Age: 52
End: 2017-12-11
Payer: COMMERCIAL

## 2017-12-11 ENCOUNTER — OFFICE VISIT (OUTPATIENT)
Dept: FAMILY MEDICINE | Facility: OTHER | Age: 52
End: 2017-12-11
Payer: COMMERCIAL

## 2017-12-11 VITALS
RESPIRATION RATE: 20 BRPM | DIASTOLIC BLOOD PRESSURE: 80 MMHG | TEMPERATURE: 98.3 F | BODY MASS INDEX: 39.94 KG/M2 | WEIGHT: 255 LBS | SYSTOLIC BLOOD PRESSURE: 136 MMHG | HEART RATE: 86 BPM | OXYGEN SATURATION: 99 %

## 2017-12-11 VITALS — WEIGHT: 250 LBS | TEMPERATURE: 98.2 F | HEART RATE: 70 BPM | BODY MASS INDEX: 39.16 KG/M2

## 2017-12-11 DIAGNOSIS — Z86.0100 HX OF COLONIC POLYPS: ICD-10-CM

## 2017-12-11 DIAGNOSIS — Z90.49 H/O PARTIAL RESECTION OF COLON: ICD-10-CM

## 2017-12-11 DIAGNOSIS — E78.5 HYPERLIPIDEMIA LDL GOAL <130: ICD-10-CM

## 2017-12-11 DIAGNOSIS — R22.0 SCALP MASS: Primary | ICD-10-CM

## 2017-12-11 DIAGNOSIS — Z01.818 PREOP GENERAL PHYSICAL EXAM: Primary | ICD-10-CM

## 2017-12-11 DIAGNOSIS — K57.32 DIVERTICULITIS OF SIGMOID COLON: ICD-10-CM

## 2017-12-11 DIAGNOSIS — R22.0 SCALP MASS: ICD-10-CM

## 2017-12-11 DIAGNOSIS — K64.8 INTERNAL HEMORRHOID, BLEEDING: ICD-10-CM

## 2017-12-11 DIAGNOSIS — E66.09 CLASS 2 OBESITY DUE TO EXCESS CALORIES WITHOUT SERIOUS COMORBIDITY WITH BODY MASS INDEX (BMI) OF 39.0 TO 39.9 IN ADULT: ICD-10-CM

## 2017-12-11 DIAGNOSIS — E66.812 CLASS 2 OBESITY DUE TO EXCESS CALORIES WITHOUT SERIOUS COMORBIDITY WITH BODY MASS INDEX (BMI) OF 39.0 TO 39.9 IN ADULT: ICD-10-CM

## 2017-12-11 DIAGNOSIS — G47.33 OBSTRUCTIVE SLEEP APNEA: ICD-10-CM

## 2017-12-11 DIAGNOSIS — F17.200 TOBACCO USE DISORDER: ICD-10-CM

## 2017-12-11 PROCEDURE — 99214 OFFICE O/P EST MOD 30 MIN: CPT | Performed by: PHYSICIAN ASSISTANT

## 2017-12-11 PROCEDURE — 46600 DIAGNOSTIC ANOSCOPY SPX: CPT | Performed by: SPECIALIST

## 2017-12-11 PROCEDURE — 99244 OFF/OP CNSLTJ NEW/EST MOD 40: CPT | Mod: 25 | Performed by: SPECIALIST

## 2017-12-11 PROCEDURE — 93000 ELECTROCARDIOGRAM COMPLETE: CPT | Performed by: PHYSICIAN ASSISTANT

## 2017-12-11 ASSESSMENT — LIFESTYLE VARIABLES: TOBACCO_USE: 1

## 2017-12-11 NOTE — NURSING NOTE
"Chief Complaint   Patient presents with     Pre-Op Exam       Initial /80 (BP Location: Right arm, Patient Position: Chair, Cuff Size: Adult Regular)  Pulse 86  Temp 98.3  F (36.8  C) (Oral)  Resp 20  Wt 255 lb (115.7 kg)  SpO2 99%  BMI 39.94 kg/m2 Estimated body mass index is 39.94 kg/(m^2) as calculated from the following:    Height as of 10/31/17: 5' 7\" (1.702 m).    Weight as of this encounter: 255 lb (115.7 kg).  Medication Reconciliation: complete  "

## 2017-12-11 NOTE — PATIENT INSTRUCTIONS
SMOKING CESSATION    What's in cigarette smoke? - Cigarette smoke contains over 4,000 chemicals. Nicotine is one of the main ingredients which is an insecticide/herbicide. It is poisonous to our nervous system, increases blood clotting risk, and decreases the body's defenses to fight off infection. Another chemical is Carbon Monoxide is an asphyxiating gas that permanently binds to blood cells and blocks the transport of oxygen. This leads to tissue death and decreases your metabolism. Tar is a chemical that coats your lungs and trachea which impairs new oxygen coming in and carbon dioxide getting out of your body.   How does smoking impact surgery? - Smoking is particularly hazardous with regards to surgery. Surgery puts stress on the body and a smoker's body is already under strain from these chemicals. Putting the two together, especially for an elective surgery, could be a recipe for disaster. Smoking before and after surgery increases your risk of heart problems, slow wound healing, delayed bone healing, blood clots, wound infection and anesthesia complications.   What are the benefits of quitting? - In 20 minutes your blood pressure will drop back down to normal. In 8 hours the carbon monoxide (a toxic gas) levels in your blood stream will drop by half, and oxygen levels will return to normal. In 48 hours your chance of having a heart attack will have decreased. All nicotine will have left your body. Your sense of taste and smell will return to a normal level. In 72 hours your bronchial tubes will relax, and your energy levels will increase. In 2 weeks your circulation will increase, and it will continue to improve for the next 10 weeks.    Recommendations for elective surgery - Ideally, patients should quit smoking 8 weeks before and at least 2 weeks after elective surgery in order to avoid complications. Simply cutting back on the amount of cigarettes smoked per day does not offer any benefit or decrease the  risk of poor wound healing, heart problems, and infection. Smokers should also start taking Vitamin C and B for two weeks before surgery and two weeks after surgery.    Ways to Stop Smokin. Nicotine patches, lozenges, or gum  2. Support Groups  3. Medications (see below)    List of Medications:  1. Varenicline Tartrate (CHANTIX)   2. Bupropion HCL (WELLBUTRIN, ZYBAN) - note: make sure Wellbutrin is for smoking cessation and not other issues   3. Nicotine Patch (NICODERM)   4. Nicotine Inhaler (NICOTROL)   5. Nicotine Gum Nicotine Polacrilex   6. Nicotine Lozenge: Nicotine Polacrilex (COMMIT)   * [place] offers a smoking support group as well!  Please visit: [email or web address]  If you are interested in these, ask about getting a prescription or talk to your primary care doctor about what may be the best way for you to quit.

## 2017-12-11 NOTE — MR AVS SNAPSHOT
After Visit Summary   12/11/2017    Nehemias Dillon    MRN: [id number]           Patient Information     Date Of Birth          1965        Visit Information        Provider Department      12/11/2017 7:30 AM Reilly Finney MD [facility] Instructions    SMOKING CESSATION    What's in cigarette smoke? - Cigarette smoke contains over 4,000 chemicals. Nicotine is one of the main ingredients which is an insecticide/herbicide. It is poisonous to our nervous system, increases blood clotting risk, and decreases the body's defenses to fight off infection. Another chemical is Carbon Monoxide is an asphyxiating gas that permanently binds to blood cells and blocks the transport of oxygen. This leads to tissue death and decreases your metabolism. Tar is a chemical that coats your lungs and trachea which impairs new oxygen coming in and carbon dioxide getting out of your body.   How does smoking impact surgery? - Smoking is particularly hazardous with regards to surgery. Surgery puts stress on the body and a smoker's body is already under strain from these chemicals. Putting the two together, especially for an elective surgery, could be a recipe for disaster. Smoking before and after surgery increases your risk of heart problems, slow wound healing, delayed bone healing, blood clots, wound infection and anesthesia complications.   What are the benefits of quitting? - In 20 minutes your blood pressure will drop back down to normal. In 8 hours the carbon monoxide (a toxic gas) levels in your blood stream will drop by half, and oxygen levels will return to normal. In 48 hours your chance of having a heart attack will have decreased. All nicotine will have left your body. Your sense of taste and smell will return to a normal level. In 72 hours your bronchial tubes will relax, and your energy levels will increase. In 2 weeks your circulation will increase, and it will continue to improve  for the next 10 weeks.    Recommendations for elective surgery - Ideally, patients should quit smoking 8 weeks before and at least 2 weeks after elective surgery in order to avoid complications. Simply cutting back on the amount of cigarettes smoked per day does not offer any benefit or decrease the risk of poor wound healing, heart problems, and infection. Smokers should also start taking Vitamin C and B for two weeks before surgery and two weeks after surgery.    Ways to Stop Smokin. Nicotine patches, lozenges, or gum  2. Support Groups  3. Medications (see below)    List of Medications:  1. Varenicline Tartrate (CHANTIX)   2. Bupropion HCL (WELLBUTRIN, ZYBAN) - note: make sure Wellbutrin is for smoking cessation and not other issues   3. Nicotine Patch (NICODERM)   4. Nicotine Inhaler (NICOTROL)   5. Nicotine Gum Nicotine Polacrilex   6. Nicotine Lozenge: Nicotine Polacrilex (COMMIT)   * [place] offers a smoking support group as well!  Please visit: [email or web address]  If you are interested in these, ask about getting a prescription or talk to your primary care doctor about what may be the best way for you to quit.                 Follow-ups after your visit        Who to contact     If you have questions or need follow up information about today's clinic visit or your schedule please contact [facility] directly at [phone].  Normal or non-critical lab and imaging results will be communicated to you by MyChart, letter or phone within 4 business days after the clinic has received the results. If you do not hear from us within 7 days, please contact the clinic through MyChart or phone. If you have a critical or abnormal lab result, we will notify you by phone as soon as possible.  Submit refill requests through [employer]Now or call your pharmacy and they will forward the refill request to us. Please allow 3 business days for your refill to be completed.          Additional  Information About Your Visit        Epoxyhart Information     [facility] gives you secure access to your electronic health record. If you see a primary care provider, you can also send messages to your care team and make appointments. If you have questions, please call your primary care clinic.  If you do not have a primary care provider, please call [phone] and they will assist you.        Care EveryWhere ID     This is your Care EveryWhere ID. This could be used by other organizations to access your [place] medical records  [id number]        Your Vitals Were     Pulse Temperature BMI (Body Mass Index)             70 98.2  F (36.8  C) (Temporal) 39.16 kg/m2          Blood Pressure from Last 3 Encounters:   10/31/17 136/80   05/28/17 (!) 144/101   11/22/16 141/86    Weight from Last 3 Encounters:   12/11/17 113.4 kg (250 lb)   10/31/17 114.3 kg (252 lb)   10/03/16 103.6 kg (228 lb 6.4 oz)              Today, you had the following     No orders found for display       Primary Care Provider Office Phone # Fax #    Luke José Rodriguez [phone] [address] [place] [address]        Equal Access to Services     ALLY STRONG AH: Hadii jim ku hadasho Soomaali, waaxda luqadaha, qaybta kaalmada adeegyada, shannan hansonn kaleigh joiner. So [facility] [phone].    ATENCIÓN: Si habla español, tiene a anthony disposición servicios gratuitos de asistencia lingüística. Llame al [phone].    We comply with applicable federal civil rights laws and [place] laws. We do not discriminate on the basis of race, color, national origin, age, disability, sex, sexual orientation, or gender identity.            Thank you!     Thank you for choosing [facility]  for your care. Our goal is always to provide you with excellent care. Hearing back from our patients is one way we can continue to improve our services. Please take a few minutes to complete the written survey that you may  receive in the mail after your visit with us. Thank you!             Your Updated Medication List - Protect others around you: Learn how to safely use, store and throw away your medicines at www.disposemymeds.org.          This list is accurate as of: 12/11/17  7:41 AM.  Always use your most recent med list.                   Brand Name Dispense Instructions for use Diagnosis    acetaminophen 325 MG tablet    TYLENOL    100 tablet    Take 2 tablets (650 mg) by mouth every 4 hours as needed for mild pain        atorvastatin 20 MG tablet    LIPITOR    90 tablet    Take 1 tablet (20 mg) by mouth daily    Hyperlipidemia LDL goal <130       buPROPion 150 MG 12 hr tablet    WELLBUTRIN SR    60 tablet    Take 1 tablet once daily and increase to 1 tablet twice daily after 4 to 7 days    Anxiety, Tobacco use disorder       cetirizine 10 MG tablet    zyrTEC    90 tablet    Take 1 tablet (10 mg) by mouth every evening    Seasonal allergic rhinitis, unspecified chronicity, unspecified trigger       EPINEPHrine 0.3 MG/0.3ML injection 2-pack    EPIPEN/ADRENACLICK/or ANY BX GENERIC EQUIV    0.6 mL    Inject 0.3 mLs (0.3 mg) into the muscle once as needed for anaphylaxis    H/O bee sting allergy       fluticasone 50 MCG/ACT spray    FLONASE    3 Bottle    Spray 1-2 sprays into both nostrils daily    Seasonal allergic rhinitis, unspecified chronicity, unspecified trigger       hydrOXYzine 25 MG tablet    ATARAX    60 tablet    Take 1-2 tablets (25-50 mg) by mouth every 6 hours as needed for anxiety    Anxiety       penicillin V potassium 500 MG tablet    VEETID    40 tablet    Take 1 tablet (500 mg) by mouth 4 times daily    Throat pain       sertraline 50 MG tablet    ZOLOFT    90 tablet    Take 1 tablet (50 mg) by mouth daily    Anxiety

## 2017-12-11 NOTE — NURSING NOTE
[facility]    Nehemias Dillon has been given the following teaching information:  Before Your Surgery booklet  Mayank: Colonoscopy  Instructions for Showering or Bathing before Surgery  Miralax colonoscopy prep instructions  Pre-op scheduled for today at 830 at [place]  Request for surgery sheet faxed to Oanh at [facility]

## 2017-12-11 NOTE — PROGRESS NOTES
[address]  [facility] [address]  [phone]  Dept: [phone]    PRE-OP EVALUATION:  Today's date: 2017    Nehemias Dillon (: 1965) presents for pre-operative evaluation assessment as requested by Dr. Finney.  He requires evaluation and anesthesia risk assessment prior to undergoing surgery/procedure for treatment of left scalp mass, internal hemorrhoids with bleeding, diverticulitis with history of colon resection, colonic polyps.  Proposed procedure: colonoscopy, internal hemorrhoid banding, and scalp mass excision   Date of Surgery/ Procedure: 17  Time of Surgery/ Procedure: 12:30pm  Hospital/Surgical Facility: [facility]  Primary Physician: Luke Rodriguez  Type of Anesthesia Anticipated: General    Patient has a Health Care Directive or Living Will:  NO    Preop Questions 2017   1.  Do you have a history of heart attack, stroke, stent, bypass or surgery on an artery in the head, neck, heart or legs? No   2.  Do you ever have any pain or discomfort in your chest? No   3.  Do you have a history of  Heart Failure? No   4.   Are you troubled by shortness of breath when:  walking on a level surface, or up a slight hill, or at night? No   5.  Do you currently have a cold, bronchitis or other respiratory infection? No   6.  Do you have a cough, shortness of breath, or wheezing? No   7.  Do you sometimes get pains in the calves of your legs when you walk? No   8. Do you or anyone in your family have previous history of blood clots? No   9.  Do you or does anyone in your family have a serious bleeding problem such as prolonged bleeding following surgeries or cuts? No   10. Have you ever had problems with anemia or been told to take iron pills? No   11. Have you had any abnormal blood loss such as black, tarry or bloody stools? No   12. Have you ever had a blood transfusion? No   13. Have you or any of your relatives ever had problems with  anesthesia? No   14. Do you have sleep apnea, excessive snoring or daytime drowsiness? No   15. Do you have any prosthetic heart valves? No   16. Do you have prosthetic joints? No           HPI:                                                      Brief HPI related to upcoming procedure:     1.   - After routine physical, found to have left scalp/neck mass. Patient saw general surgery for evaluation. Mass likely to be sebaceous cyst vs lipoma. Will be removed due to chronic changes (bigger and smaller, sometimes with yellow & smelly discharge, gets painful and red).     2.   - Patient with history of diverticulitis with sigmoid resection several years ago. Now with chronic rectal bleeding thought to be due to internal hemorrhoids found on last colonoscopy. These will be banded to decrease bleeding. He is also due for routine colonoscopy due to history of polyps and his diverticulitis.          See problem list for active medical problems.  Problems all longstanding and stable, except as noted/documented.  See ROS for pertinent symptoms related to these conditions.                                                                                                      HYPERLIPIDEMIA - Patient has a history of hyperlipidemia requiring medication for treatment with recent good control. Patient reports no problems or side effects with the medication.                                                                                                                                                          MEDICAL HISTORY:                                                    Patient Active Problem List    Diagnosis Date Noted     Diverticulitis of sigmoid colon 06/06/2012     Priority: High     History of diverticulosis 10/31/2017     Priority: Medium     External hemorrhoids 10/31/2017     Priority: Medium     H/O partial resection of colon 10/31/2017     Priority: Medium     Sebaceous cyst 10/31/2017     Priority: Medium     TORI  (obstructive sleep apnea) 10/31/2017     Priority: Medium     Inguinal hernia, left 10/31/2017     Priority: Medium     Seasonal allergic rhinitis, unspecified chronicity, unspecified trigger 10/31/2017     Priority: Medium     Acute chest pain 08/30/2016     Priority: Medium     Tobacco use disorder 08/30/2016     Priority: Medium     Hidradenitis suppurativa 12/21/2012     Priority: Medium     Suppurative hidradenitis 12/21/2012     Priority: Medium     Diverticulitis of colon 06/25/2012     Priority: Medium     Hyperlipidemia LDL goal <130 03/11/2011     Priority: Medium     Allergic to bees 05/06/2009     Priority: Medium     Obstructive sleep apnea 05/01/2009     Priority: Medium     Carpal tunnel syndrome 03/24/2009     Priority: Medium     (Problem list name updated by automated process. Provider to review and confirm.)       Sleep disorder 03/24/2009     Priority: Medium     Class 2 obesity due to excess calories without serious comorbidity with body mass index (BMI) of 39.0 to 39.9 in adult 03/24/2009     Priority: Medium     Pure hyperglyceridemia 02/04/2005     Priority: Medium     Other and unspecified derangement of medial meniscus 07/05/2002     Priority: Medium     Inguinal hernia 06/06/2012     Priority: Low     Anxiety 07/15/2009     Priority: Low      Past Medical History:   Diagnosis Date     Diverticulitis      DM type 2 (diabetes mellitus, type 2) (H)     Diet resolved DM and diagnosis eliminated from problem list  3/2013     Hypertriglyceridemia      Lumbago     Hx of injury with low back pain & radiculitis, resolved with conservative therapy     TORI (obstructive sleep apnea) 10/31/2017     Prostatitis, unspecified      Past Surgical History:   Procedure Laterality Date     ARTHROPLASTY SHOULDER      right     CARPAL TUNNEL RELEASE RT/LT  2012    left     HC KNEE SCOPE,MED/LAT MENISECTOMY  07/08/2002    Right knee arthroscopy, lateral menisectomy      REPAIR ING HERNIA,5+Y/O,REDUCIBL  1997     Marlex mesh repair of right indirect inguinal hernia     SIGMOIDECTOMY       Current Outpatient Prescriptions   Medication Sig Dispense Refill     atorvastatin (LIPITOR) 20 MG tablet Take 1 tablet (20 mg) by mouth daily 90 tablet 1     acetaminophen (TYLENOL) 325 MG tablet Take 2 tablets (650 mg) by mouth every 4 hours as needed for mild pain 100 tablet      OTC products: None, except as noted above    Allergies   Allergen Reactions     Bees [Bees]      Nkda [No Known Drug Allergies]       Latex Allergy: NO    Social History   Substance Use Topics     Smoking status: Current Every Day Smoker     Packs/day: 1.50     Years: 20.00     Types: Cigarettes     Smokeless tobacco: Never Used      Comment: smokes 1 pack daily again since 6-09     Alcohol use Yes      Comment: one drink rarely     History   Drug Use No       REVIEW OF SYSTEMS:                                                    Constitutional, neuro, ENT, endocrine, pulmonary, cardiac, gastrointestinal, genitourinary, musculoskeletal, integument and psychiatric systems are negative, except as otherwise noted.      EXAM:                                                    /80 (BP Location: Right arm, Patient Position: Chair, Cuff Size: Adult Regular)  Pulse 86  Temp 98.3  F (36.8  C) (Oral)  Resp 20  Wt 255 lb (115.7 kg)  SpO2 99%  BMI 39.94 kg/m2    GENERAL APPEARANCE: healthy, alert and no distress     EYES: EOMI,  PERRL     HENT: ear canals and TM's normal and nose and mouth without ulcers or lesions     NECK: Large 2x2 cm mass on left neck/scalp behind left ear at scalp mass and edge of hairline, non-tender, not warm, soft, movable, no adenopathy, no asymmetry, or scars and thyroid normal to palpation     RESP: lungs clear to auscultation - no rales, rhonchi or wheezes     CV: regular rates and rhythm, normal S1 S2, no S3 or S4 and no murmur, click or rub     MS: extremities normal- no gross deformities noted, no evidence of inflammation in  joints, FROM in all extremities.     SKIN: no suspicious lesions or rashes     NEURO: Normal strength and tone, sensory exam grossly normal, mentation intact and speech normal     PSYCH: mentation appears normal. and affect normal/bright    DIAGNOSTICS:                                                    EKG: appears normal, NSR, normal axis, normal intervals, no acute ST/T changes c/w ischemia, no LVH by voltage criteria, unchanged from previous tracings    Recent Labs   Lab Test  10/31/17   1634  08/29/16   2335  08/26/16   1627   HGB  15.8  15.4   --    PLT  254  275   --    INR   --   1.06   --    NA  137  140  142   POTASSIUM  4.3  3.9  4.1   CR  0.87  0.78  0.88   A1C  5.8   --   5.5        IMPRESSION:                                                    Reason for surgery/procedure: Left scalp/neck mass, internal hemorrhoids with bleeding, history of diverticulitis with sigmoid resection, history of colonic polyps  Diagnosis/reason for consult: Pre operative consult     The proposed surgical procedure is considered INTERMEDIATE risk.    REVISED CARDIAC RISK INDEX  The patient has the following serious cardiovascular risks for perioperative complications such as (MI, PE, VFib and 3  AV Block):  No serious cardiac risks  INTERPRETATION: 0 risks: Class I (very low risk - 0.4% complication rate)    The patient has the following additional risks for perioperative complications:  No identified additional risks      ICD-10-CM    1. Preop general physical exam Z01.818 EKG 12-lead complete w/read - Clinics   2. Scalp mass R22.0    3. Internal hemorrhoid, bleeding K64.8    4. Diverticulitis of sigmoid colon K57.32    5. H/O partial resection of colon Z90.49    6. Hyperlipidemia LDL goal <130 E78.5    7. Obstructive sleep apnea G47.33    8. Tobacco use disorder F17.200    9. Class 2 obesity due to excess calories without serious comorbidity with body mass index (BMI) of 39.0 to 39.9 in adult E66.09     Z68.39         RECOMMENDATIONS:                                                        Obstructive Sleep Apnea (or suspected sleep apnea)  Patient is clearly advised to use their home CPAP when released from surgery    -- Patient is to take all scheduled medications on the day of surgery EXCEPT for modifications listed below.    -- Reviewed colonoscopy prep, patient to remain on clear diet until surgery, surgery at 12:30, advised to call surgeon to clarify if can have clear liquids typically until 4-6 hours before surgery     APPROVAL GIVEN to proceed with proposed procedure, without further diagnostic evaluation       Signed Electronically by: Amanda Wallace-KAYLEIGH Solitario    Copy of this evaluation report is provided to requesting physician.    [facility] Preop Guidelines

## 2017-12-11 NOTE — PROGRESS NOTES
"Consult requested by Nj Guerra    Reason for consultation - neck mass, hemorrhoids, due for colonoscopy    HPI:   patient is a 52-year-old white male presenting with multiple complaints. He now presents with a neck mass has been present for many years that increases and decreases in size. He was told by a dermatologist as a \"plugged up pore.\"  He states he would like to have it excised. Denies any fevers chills or drainage.    He also has a history of diverticulitis which she had a sigmoid resection several years ago. At that time he was told that he has a hemorrhoid he should have checked as when he does have hard bowel movements he reports occasional bleeding and pain. He drives truck which irritates it. He cannot feel anything coming down at this time. He also has a history of polyps and he is due for colonoscopy.    Past Medical History:   Diagnosis Date     Diverticulitis      DM type 2 (diabetes mellitus, type 2) (H)     Diet resolved DM and diagnosis eliminated from problem list  3/2013     Hypertriglyceridemia      Lumbago     Hx of injury with low back pain & radiculitis, resolved with conservative therapy     TORI (obstructive sleep apnea) 10/31/2017     Prostatitis, unspecified      Unspecified closed fracture of ankle     Ankle fracture     Past Surgical History:   Procedure Laterality Date     ARTHROPLASTY SHOULDER      right     CARPAL TUNNEL RELEASE RT/LT  2012    left     HC KNEE SCOPE,MED/LAT MENISECTOMY  07/08/2002    Right knee arthroscopy, lateral menisectomy     HC REPAIR ING HERNIA,5+Y/O,REDUCIBL  1997    Marlex mesh repair of right indirect inguinal hernia     SIGMOIDECTOMY       Current Outpatient Prescriptions   Medication     atorvastatin (LIPITOR) 20 MG tablet     hydrOXYzine (ATARAX) 25 MG tablet     cetirizine (ZYRTEC) 10 MG tablet     fluticasone (FLONASE) 50 MCG/ACT spray     EPINEPHrine (EPIPEN/ADRENACLICK/OR ANY BX GENERIC EQUIV) 0.3 MG/0.3ML injection 2-pack     penicillin V " potassium (VEETID) 500 MG tablet     sertraline (ZOLOFT) 50 MG tablet     acetaminophen (TYLENOL) 325 MG tablet     buPROPion (WELLBUTRIN SR) 150 MG 12 hr tablet     No current facility-administered medications for this visit.         Allergies   Allergen Reactions     Bees [Bees]      Nkda [No Known Drug Allergies]      Social History   Substance Use Topics     Smoking status: Current Every Day Smoker     Packs/day: 1.50     Years: 20.00     Types: Cigarettes     Smokeless tobacco: Never Used      Comment: smokes 1 pack daily again since 6-09     Alcohol use Yes      Comment: one drink rarely     Family History   Problem Relation Age of Onset     Adopted: Yes     Unknown/Adopted Mother      Unknown/Adopted Father      Unknown/Adopted Maternal Grandmother      Unknown/Adopted Maternal Grandfather      Unknown/Adopted Paternal Grandmother      Unknown/Adopted Paternal Grandfather      Unknown/Adopted Brother      Unknown/Adopted Sister       ROS: 10 point ROS neg other than the symptoms noted above in the HPI.    PE:  B/P: Data Unavailable, T: 98.2, P: 70, R: Data Unavailable  General: well developed, well nourished WM who appears their stated age  HEENT: NC/AT, EOMI, (-)icterus, (-)injection, left post scalp/neck - 2x2x1.5 cm sq mass, mobile, non tender  Neck: Supple, No JVD  Chest: CTA  Heart: S1, S2, (-)m/r/g  Abd: Soft, non tender, non distended  Rectal: No masses  Anoscopy: Internal hemorroids  Ext; Warm, no edema  Psych: AAOx3  Neuro: No focal deficits      Impression/Plan:  This is a 52-year-old gentleman presenting with multiple complaints mainly of a neck mass and rectal bleeding. In addition he is due for colonoscopy. On exam neck/scalp mass is most likely a sebaceous cyst versus lipoma. I discussed this with him and he expressed understanding. On anoscopy he does have some internal hemorrhoids which are the most likely source of his bleeding. In addition he is due for colonoscopy due to history of polyps.  After discussion with the patient and plan this time is for repeat colonoscopy with internal hemorrhoid banding at that time and excision of his neck/scalp mass. The procedure, risks, benefits and alternatives were discussed and he agrees to proceed. We'll be scheduling the near future.    Reilly Finney MD, FACS

## 2017-12-11 NOTE — LETTER
"    12/11/2017         RE: Nehemias Dillon  [address] [place] [address]        Dear Colleague,    Thank you for referring your patient, Nehemias Dillon, to the [facility]. Please see a copy of my visit note below.    Consult requested by Nj Guerra    Reason for consultation - neck mass, hemorrhoids, due for colonoscopy    HPI:   patient is a 52-year-old white male presenting with multiple complaints. He now presents with a neck mass has been present for many years that increases and decreases in size. He was told by a dermatologist as a \"plugged up pore.\"  He states he would like to have it excised. Denies any fevers chills or drainage.    He also has a history of diverticulitis which she had a sigmoid resection several years ago. At that time he was told that he has a hemorrhoid he should have checked as when he does have hard bowel movements he reports occasional bleeding and pain. He drives truck which irritates it. He cannot feel anything coming down at this time. He also has a history of polyps and he is due for colonoscopy.    Past Medical History:   Diagnosis Date     Diverticulitis      DM type 2 (diabetes mellitus, type 2) (H)     Diet resolved DM and diagnosis eliminated from problem list  3/2013     Hypertriglyceridemia      Lumbago     Hx of injury with low back pain & radiculitis, resolved with conservative therapy     TORI (obstructive sleep apnea) 10/31/2017     Prostatitis, unspecified      Unspecified closed fracture of ankle     Ankle fracture     Past Surgical History:   Procedure Laterality Date     ARTHROPLASTY SHOULDER      right     CARPAL TUNNEL RELEASE RT/LT  2012    left     HC KNEE SCOPE,MED/LAT MENISECTOMY  07/08/2002    Right knee arthroscopy, lateral menisectomy     HC REPAIR ING HERNIA,5+Y/O,REDUCIBL  1997    Marlex mesh repair of right indirect inguinal hernia     SIGMOIDECTOMY       Current Outpatient Prescriptions   Medication     atorvastatin (LIPITOR) 20 " MG tablet     hydrOXYzine (ATARAX) 25 MG tablet     cetirizine (ZYRTEC) 10 MG tablet     fluticasone (FLONASE) 50 MCG/ACT spray     EPINEPHrine (EPIPEN/ADRENACLICK/OR ANY BX GENERIC EQUIV) 0.3 MG/0.3ML injection 2-pack     penicillin V potassium (VEETID) 500 MG tablet     sertraline (ZOLOFT) 50 MG tablet     acetaminophen (TYLENOL) 325 MG tablet     buPROPion (WELLBUTRIN SR) 150 MG 12 hr tablet     No current facility-administered medications for this visit.         Allergies   Allergen Reactions     Bees [Bees]      Nkda [No Known Drug Allergies]      Social History   Substance Use Topics     Smoking status: Current Every Day Smoker     Packs/day: 1.50     Years: 20.00     Types: Cigarettes     Smokeless tobacco: Never Used      Comment: smokes 1 pack daily again since 6-09     Alcohol use Yes      Comment: one drink rarely     Family History   Problem Relation Age of Onset     Adopted: Yes     Unknown/Adopted Mother      Unknown/Adopted Father      Unknown/Adopted Maternal Grandmother      Unknown/Adopted Maternal Grandfather      Unknown/Adopted Paternal Grandmother      Unknown/Adopted Paternal Grandfather      Unknown/Adopted Brother      Unknown/Adopted Sister       ROS: 10 point ROS neg other than the symptoms noted above in the HPI.    PE:  B/P: Data Unavailable, T: 98.2, P: 70, R: Data Unavailable  General: well developed, well nourished WM who appears their stated age  HEENT: NC/AT, EOMI, (-)icterus, (-)injection, left post scalp/neck - 2x2x1.5 cm sq mass, mobile, non tender  Neck: Supple, No JVD  Chest: CTA  Heart: S1, S2, (-)m/r/g  Abd: Soft, non tender, non distended  Rectal: No masses  Anoscopy: Internal hemorroids  Ext; Warm, no edema  Psych: AAOx3  Neuro: No focal deficits      Impression/Plan:  This is a 52-year-old gentleman presenting with multiple complaints mainly of a neck mass and rectal bleeding. In addition he is due for colonoscopy. On exam neck/scalp mass is most likely a sebaceous cyst  versus lipoma. I discussed this with him and he expressed understanding. On anoscopy he does have some internal hemorrhoids which are the most likely source of his bleeding. In addition he is due for colonoscopy due to history of polyps. After discussion with the patient and plan this time is for repeat colonoscopy with internal hemorrhoid banding at that time and excision of his neck/scalp mass. The procedure, risks, benefits and alternatives were discussed and he agrees to proceed. We'll be scheduling the near future.    Reilly Finney MD, FACS        Again, thank you for allowing me to participate in the care of your patient.        Sincerely,        Reilly Finney MD

## 2017-12-11 NOTE — PROGRESS NOTES
"[address]  [facility] [address]  [phone]  Dept: [phone]    PRE-OP EVALUATION:  Today's date: 2017    Nehemias Dillon (: 1965) presents for pre-operative evaluation assessment as requested by Dr. Reilly Finney.  He requires evaluation and anesthesia risk assessment prior to undergoing surgery/procedure for treatment of left scalp mass, internal hemorrhoids and history of colonic polyps.  Proposed procedure: Colonoscopy with hemorrhoid banding and left neck mass excision     Date of Surgery/ Procedure: TBD  Time of Surgery/ Procedure: TBD   Hospital/Surgical Facility: [facility]   Primary Physician: Luke Rodriguez  Type of Anesthesia Anticipated: General    Patient has a Health Care Directive or Living Will:  {YES/NO:573142::\"NO\"}    {PREOP QUESTIONNAIRE OPTIONS 2 (by MA):942161}    HPI:                                                          {Ch. Problems:385818}    MEDICAL HISTORY:                                                      Patient Active Problem List    Diagnosis Date Noted     Diverticulitis of sigmoid colon 2012     Priority: High     History of diverticulosis 10/31/2017     Priority: Medium     External hemorrhoids 10/31/2017     Priority: Medium     H/O partial resection of colon 10/31/2017     Priority: Medium     Sebaceous cyst 10/31/2017     Priority: Medium     TORI (obstructive sleep apnea) 10/31/2017     Priority: Medium     Inguinal hernia, left 10/31/2017     Priority: Medium     Seasonal allergic rhinitis, unspecified chronicity, unspecified trigger 10/31/2017     Priority: Medium     Acute chest pain 2016     Priority: Medium     Tobacco use disorder 2016     Priority: Medium     Hidradenitis suppurativa 2012     Priority: Medium     Suppurative hidradenitis 2012     Priority: Medium     Diverticulitis of colon 2012     Priority: Medium     Hyperlipidemia LDL " goal <130 03/11/2011     Priority: Medium     Allergic to bees 05/06/2009     Priority: Medium     Obstructive sleep apnea 05/01/2009     Priority: Medium     Carpal tunnel syndrome 03/24/2009     Priority: Medium     (Problem list name updated by automated process. Provider to review and confirm.)       Sleep disorder 03/24/2009     Priority: Medium     Obesity 03/24/2009     Priority: Medium     Pure hyperglyceridemia 02/04/2005     Priority: Medium     Other and unspecified derangement of medial meniscus 07/05/2002     Priority: Medium     Inguinal hernia 06/06/2012     Priority: Low     Anxiety 07/15/2009     Priority: Low      Past Medical History:   Diagnosis Date     Diverticulitis      DM type 2 (diabetes mellitus, type 2) (H)     Diet resolved DM and diagnosis eliminated from problem list  3/2013     Hypertriglyceridemia      Lumbago     Hx of injury with low back pain & radiculitis, resolved with conservative therapy     TORI (obstructive sleep apnea) 10/31/2017     Prostatitis, unspecified      Unspecified closed fracture of ankle     Ankle fracture     Past Surgical History:   Procedure Laterality Date     ARTHROPLASTY SHOULDER      right     CARPAL TUNNEL RELEASE RT/LT  2012    left     HC KNEE SCOPE,MED/LAT MENISECTOMY  07/08/2002    Right knee arthroscopy, lateral menisectomy     HC REPAIR ING HERNIA,5+Y/O,REDUCIBL  1997    Marlex mesh repair of right indirect inguinal hernia     SIGMOIDECTOMY       Current Outpatient Prescriptions   Medication Sig Dispense Refill     atorvastatin (LIPITOR) 20 MG tablet Take 1 tablet (20 mg) by mouth daily 90 tablet 1     hydrOXYzine (ATARAX) 25 MG tablet Take 1-2 tablets (25-50 mg) by mouth every 6 hours as needed for anxiety 60 tablet 1     cetirizine (ZYRTEC) 10 MG tablet Take 1 tablet (10 mg) by mouth every evening 90 tablet 1     fluticasone (FLONASE) 50 MCG/ACT spray Spray 1-2 sprays into both nostrils daily 3 Bottle 1     EPINEPHrine (EPIPEN/ADRENACLICK/OR ANY  "BX GENERIC EQUIV) 0.3 MG/0.3ML injection 2-pack Inject 0.3 mLs (0.3 mg) into the muscle once as needed for anaphylaxis 0.6 mL 1     acetaminophen (TYLENOL) 325 MG tablet Take 2 tablets (650 mg) by mouth every 4 hours as needed for mild pain 100 tablet      OTC products: {OTC ANALGESICS:137472}    Allergies   Allergen Reactions     Bees [Bees]      Nkda [No Known Drug Allergies]       Latex Allergy: {YES/NO WITH DEFAULT:325814::\"NO\"}    Social History   Substance Use Topics     Smoking status: Current Every Day Smoker     Packs/day: 1.50     Years: 20.00     Types: Cigarettes     Smokeless tobacco: Never Used      Comment: smokes 1 pack daily again since 6-09     Alcohol use Yes      Comment: one drink rarely     History   Drug Use No       REVIEW OF SYSTEMS:                                                    {ROS Preop Choices:826388}    EXAM:                                                    There were no vitals taken for this visit.  {EXAM Preop Choices:750692}    DIAGNOSTICS:                                                    {DIAGNOSTIC FOR PREOP:393688}    Recent Labs   Lab Test  10/31/17   1634  08/29/16   2335  08/26/16   1627   HGB  15.8  15.4   --    PLT  254  275   --    INR   --   1.06   --    NA  137  140  142   POTASSIUM  4.3  3.9  4.1   CR  0.87  0.78  0.88   A1C  5.8   --   5.5        IMPRESSION:                                                    {PREOP REASONS:221339::\"Reason for surgery/procedure: ***\",\"Diagnosis/reason for consult: ***\"}    The proposed surgical procedure is considered {HIGH=major cardiovascular or procedures requiring prolonged anesthesia >4 hours or large fluid shifts;    INTERMEDIATE=abdominal, most orthopedic and intrathoracic surgery; LOW= endoscopy, cataract and breast surgery:385608} risk.    REVISED CARDIAC RISK INDEX  The patient has the following serious cardiovascular risks for perioperative complications such as (MI, PE, VFib and 3  AV Block):  {PREOP REVISED CARDIAC " "INDEX (RCI):234751:p:\"No serious cardiac risks\"}  INTERPRETATION: {REVISED CARDIAC RISK INTERPRETATION:931230}    The patient has the following additional risks for perioperative complications:  {Additional perioperative risks:325030:p:\"No identified additional risks\"}    No diagnosis found.    RECOMMENDATIONS:                                                      {IMPORTANT - Conditions - complete carefully!!:422666}    {IMPORTANT - Medications:408855::\"--Patient is to take all scheduled medications on the day of surgery EXCEPT for modifications listed below.\"}    {IMPORTANT - Approval:710122:p:\"APPROVAL GIVEN to proceed with proposed procedure, without further diagnostic evaluation\"}       Signed Electronically by: Amanda Wallace-KAYLEIGH Solitario    Copy of this evaluation report is provided to requesting physician.    [facility] Preop Guidelines  "

## 2017-12-11 NOTE — MR AVS SNAPSHOT
After Visit Summary   12/11/2017    Nehemias Dillon    MRN: [id number]           Patient Information     Date Of Birth          1965        Visit Information        Provider Department      12/11/2017 8:30 AM Amanda Geiger PA-C [facility]        Today's Diagnoses     Preop general physical exam    -  1    Scalp mass        Internal hemorrhoid, bleeding        Diverticulitis of sigmoid colon        H/O partial resection of colon        Hyperlipidemia LDL goal <130        Obstructive sleep apnea        Tobacco use disorder        Class 2 obesity due to excess calories without serious comorbidity with body mass index (BMI) of 39.0 to 39.9 in adult          Care Instructions      Before Your Surgery      Call your surgeon if there is any change in your health. This includes signs of a cold or flu (such as a sore throat, runny nose, cough, rash or fever).    Do not smoke, drink alcohol or take over the counter medicine (unless your surgeon or primary care doctor tells you to) for the 24 hours before and after surgery.    If you take prescribed drugs: Follow your doctor s orders about which medicines to take and which to stop until after surgery.    Eating and drinking prior to surgery: follow the instructions from your surgeon    Take a shower or bath the night before surgery. Use the soap your surgeon gave you to gently clean your skin. If you do not have soap from your surgeon, use your regular soap. Do not shave or scrub the surgery site.  Wear clean pajamas and have clean sheets on your bed.   Before Your Surgery    Call your surgeon if there is any change in your health. This includes signs of a cold or flu (such as a sore throat, runny nose, cough, rash or fever).  Do not smoke, drink alcohol or take over the counter medicine (unless your surgeon or primary care doctor tells you to) for the 24 hours before and after surgery.  If you take prescribed drugs: Follow  your doctor s orders about which medicines to take and which to stop until after surgery.  Eating and drinking prior to surgery: follow the instructions from your surgeon  Take a shower or bath the night before surgery. Use the soap your surgeon gave you to gently clean your skin. If you do not have soap from your surgeon, use your regular soap. Do not shave or scrub the surgery site.  Wear clean pajamas and have clean sheets on your bed.           Follow-ups after your visit        Your next 10 appointments already scheduled     Dec 18, 2017  7:30 AM CST   Return Visit with Reilly Finney MD   [facility] ([facility])    [address] [address] [phone]              Who to contact     If you have questions or need follow up information about today's clinic visit or your schedule please contact [facility] [place] directly at [phone].  Normal or non-critical lab and imaging results will be communicated to you by Telecardiahart, letter or phone within 4 business days after the clinic has received the results. If you do not hear from us within 7 days, please contact the clinic through avVentat or phone. If you have a critical or abnormal lab result, we will notify you by phone as soon as possible.  Submit refill requests through [employer] or call your pharmacy and they will forward the refill request to us. Please allow 3 business days for your refill to be completed.          Additional Information About Your Visit        TelecardiaharCrowdComfort Information     [employer] gives you secure access to your electronic health record. If you see a primary care provider, you can also send messages to your care team and make appointments. If you have questions, please call your primary care clinic.  If you do not have a primary care provider, please call [phone] and they will assist you.        Care EveryWhere ID     This is your Care EveryWhere ID. This could be used by other  organizations to access your [facility] medical records  [id number]        Your Vitals Were     Pulse Temperature Respirations Pulse Oximetry BMI (Body Mass Index)       86 98.3  F (36.8  C) (Oral) 20 99% 39.94 kg/m2        Blood Pressure from Last 3 Encounters:   12/11/17 136/80   10/31/17 136/80   05/28/17 (!) 144/101    Weight from Last 3 Encounters:   12/11/17 255 lb (115.7 kg)   12/11/17 250 lb (113.4 kg)   10/31/17 252 lb (114.3 kg)              We Performed the Following     EKG 12-lead complete w/read - Clinics        Primary Care Provider Office Phone # Fax #    Luke Rodriguez [phone] [address] [place] [address]        Equal Access to Services     [facility]: Hadii jim henderson hadasho Soomaali, waaxda luqadaha, qaybta kaalmada adeegyalouis, shannan erazo . So [facility] [phone].    ATENCIÓN: Si habla español, tiene a anthony disposición servicios gratuitos de asistencia lingüística. [facility] [phone].    We comply with applicable federal civil rights laws and [place] laws. We do not discriminate on the basis of race, color, national origin, age, disability, sex, sexual orientation, or gender identity.            Thank you!     Thank you for choosing [facility]  for your care. Our goal is always to provide you with excellent care. Hearing back from our patients is one way we can continue to improve our services. Please take a few minutes to complete the written survey that you may receive in the mail after your visit with us. Thank you!             Your Updated Medication List - Protect others around you: Learn how to safely use, store and throw away your medicines at www.disposemymeds.org.          This list is accurate as of: 12/11/17  9:11 AM.  Always use your most recent med list.                   Brand Name Dispense Instructions for use Diagnosis    acetaminophen 325 MG tablet    TYLENOL    100 tablet    Take 2 tablets  (650 mg) by mouth every 4 hours as needed for mild pain        atorvastatin 20 MG tablet    LIPITOR    90 tablet    Take 1 tablet (20 mg) by mouth daily    Hyperlipidemia LDL goal <130

## 2017-12-11 NOTE — NURSING NOTE
"Chief Complaint   Patient presents with     Rectal Problem     possible hemorrhoid     Mass     neck mass     Consult     referring Michael       Initial Pulse 70  Temp 98.2  F (36.8  C) (Temporal)  Wt 113.4 kg (250 lb)  BMI 39.16 kg/m2 Estimated body mass index is 39.16 kg/(m^2) as calculated from the following:    Height as of 10/31/17: 1.702 m (5' 7\").    Weight as of this encounter: 113.4 kg (250 lb).  Medication Reconciliation: complete    "

## 2017-12-12 ENCOUNTER — HOSPITAL ENCOUNTER (OUTPATIENT)
Facility: CLINIC | Age: 52
Discharge: HOME | End: 2017-12-12
Attending: SPECIALIST | Admitting: SPECIALIST
Payer: COMMERCIAL

## 2017-12-12 ENCOUNTER — SURGERY (OUTPATIENT)
Age: 52
End: 2017-12-12

## 2017-12-12 ENCOUNTER — ANESTHESIA (OUTPATIENT)
Dept: GASTROENTEROLOGY | Facility: CLINIC | Age: 52
End: 2017-12-12
Payer: COMMERCIAL

## 2017-12-12 VITALS
BODY MASS INDEX: 40.02 KG/M2 | SYSTOLIC BLOOD PRESSURE: 135 MMHG | OXYGEN SATURATION: 97 % | DIASTOLIC BLOOD PRESSURE: 75 MMHG | TEMPERATURE: 97.8 F | RESPIRATION RATE: 16 BRPM | WEIGHT: 255 LBS | HEIGHT: 67 IN

## 2017-12-12 DIAGNOSIS — G89.18 ACUTE POST-OPERATIVE PAIN: Primary | ICD-10-CM

## 2017-12-12 LAB — COLONOSCOPY: NORMAL

## 2017-12-12 PROCEDURE — 25000125 ZZHC RX 250: Performed by: NURSE ANESTHETIST, CERTIFIED REGISTERED

## 2017-12-12 PROCEDURE — 11422 EXC H-F-NK-SP B9+MARG 1.1-2: CPT | Performed by: SPECIALIST

## 2017-12-12 PROCEDURE — 46221 LIGATION OF HEMORRHOID(S): CPT | Performed by: SPECIALIST

## 2017-12-12 PROCEDURE — 40000299 ZZH STATISTIC ENDO RECOVERY CLASS 1:3 EA ADD HOUR: Performed by: SPECIALIST

## 2017-12-12 PROCEDURE — 25000125 ZZHC RX 250: Performed by: SPECIALIST

## 2017-12-12 PROCEDURE — G0105 COLORECTAL SCRN; HI RISK IND: HCPCS | Performed by: SPECIALIST

## 2017-12-12 PROCEDURE — 12031 INTMD RPR S/A/T/EXT 2.5 CM/<: CPT | Performed by: SPECIALIST

## 2017-12-12 PROCEDURE — 88304 TISSUE EXAM BY PATHOLOGIST: CPT | Performed by: SPECIALIST

## 2017-12-12 PROCEDURE — 88304 TISSUE EXAM BY PATHOLOGIST: CPT | Mod: 26 | Performed by: SPECIALIST

## 2017-12-12 PROCEDURE — 45378 DIAGNOSTIC COLONOSCOPY: CPT | Performed by: SPECIALIST

## 2017-12-12 PROCEDURE — 12031 INTMD RPR S/A/T/EXT 2.5 CM/<: CPT | Mod: 59 | Performed by: SPECIALIST

## 2017-12-12 PROCEDURE — 40000296 ZZH STATISTIC ENDO RECOVERY CLASS 1:2 FIRST HOUR: Performed by: SPECIALIST

## 2017-12-12 PROCEDURE — 25000128 H RX IP 250 OP 636: Performed by: NURSE ANESTHETIST, CERTIFIED REGISTERED

## 2017-12-12 RX ORDER — FENTANYL CITRATE 50 UG/ML
25-50 INJECTION, SOLUTION INTRAMUSCULAR; INTRAVENOUS
Status: DISCONTINUED | OUTPATIENT
Start: 2017-12-12 | End: 2017-12-12 | Stop reason: HOSPADM

## 2017-12-12 RX ORDER — ONDANSETRON 2 MG/ML
4 INJECTION INTRAMUSCULAR; INTRAVENOUS EVERY 30 MIN PRN
Status: DISCONTINUED | OUTPATIENT
Start: 2017-12-12 | End: 2017-12-12 | Stop reason: HOSPADM

## 2017-12-12 RX ORDER — ONDANSETRON 4 MG/1
4 TABLET, ORALLY DISINTEGRATING ORAL EVERY 30 MIN PRN
Status: DISCONTINUED | OUTPATIENT
Start: 2017-12-12 | End: 2017-12-12

## 2017-12-12 RX ORDER — NALOXONE HYDROCHLORIDE 0.4 MG/ML
.1-.4 INJECTION, SOLUTION INTRAMUSCULAR; INTRAVENOUS; SUBCUTANEOUS
Status: DISCONTINUED | OUTPATIENT
Start: 2017-12-12 | End: 2017-12-12 | Stop reason: HOSPADM

## 2017-12-12 RX ORDER — CEFAZOLIN SODIUM 1 G/3ML
1 INJECTION, POWDER, FOR SOLUTION INTRAMUSCULAR; INTRAVENOUS SEE ADMIN INSTRUCTIONS
Status: DISCONTINUED | OUTPATIENT
Start: 2017-12-12 | End: 2017-12-12 | Stop reason: HOSPADM

## 2017-12-12 RX ORDER — SODIUM CHLORIDE, SODIUM LACTATE, POTASSIUM CHLORIDE, CALCIUM CHLORIDE 600; 310; 30; 20 MG/100ML; MG/100ML; MG/100ML; MG/100ML
INJECTION, SOLUTION INTRAVENOUS CONTINUOUS
Status: DISCONTINUED | OUTPATIENT
Start: 2017-12-12 | End: 2017-12-12

## 2017-12-12 RX ORDER — HYDROCODONE BITARTRATE AND ACETAMINOPHEN 5; 325 MG/1; MG/1
1-2 TABLET ORAL EVERY 4 HOURS PRN
Qty: 15 TABLET | Refills: 0 | Status: SHIPPED | OUTPATIENT
Start: 2017-12-12 | End: 2022-04-21

## 2017-12-12 RX ORDER — ONDANSETRON 4 MG/1
4 TABLET, ORALLY DISINTEGRATING ORAL EVERY 30 MIN PRN
Status: DISCONTINUED | OUTPATIENT
Start: 2017-12-12 | End: 2017-12-12 | Stop reason: HOSPADM

## 2017-12-12 RX ORDER — SODIUM CHLORIDE, SODIUM LACTATE, POTASSIUM CHLORIDE, CALCIUM CHLORIDE 600; 310; 30; 20 MG/100ML; MG/100ML; MG/100ML; MG/100ML
INJECTION, SOLUTION INTRAVENOUS CONTINUOUS
Status: DISCONTINUED | OUTPATIENT
Start: 2017-12-12 | End: 2017-12-12 | Stop reason: HOSPADM

## 2017-12-12 RX ORDER — LIDOCAINE HYDROCHLORIDE AND EPINEPHRINE 10; 10 MG/ML; UG/ML
INJECTION, SOLUTION INFILTRATION; PERINEURAL PRN
Status: DISCONTINUED | OUTPATIENT
Start: 2017-12-12 | End: 2017-12-12 | Stop reason: HOSPADM

## 2017-12-12 RX ORDER — ONDANSETRON 2 MG/ML
4 INJECTION INTRAMUSCULAR; INTRAVENOUS EVERY 30 MIN PRN
Status: DISCONTINUED | OUTPATIENT
Start: 2017-12-12 | End: 2017-12-12

## 2017-12-12 RX ORDER — PROPOFOL 10 MG/ML
INJECTION, EMULSION INTRAVENOUS PRN
Status: DISCONTINUED | OUTPATIENT
Start: 2017-12-12 | End: 2017-12-12

## 2017-12-12 RX ORDER — MEPERIDINE HYDROCHLORIDE 25 MG/ML
12.5 INJECTION INTRAMUSCULAR; INTRAVENOUS; SUBCUTANEOUS
Status: DISCONTINUED | OUTPATIENT
Start: 2017-12-12 | End: 2017-12-12

## 2017-12-12 RX ORDER — FENTANYL CITRATE 50 UG/ML
INJECTION, SOLUTION INTRAMUSCULAR; INTRAVENOUS PRN
Status: DISCONTINUED | OUTPATIENT
Start: 2017-12-12 | End: 2017-12-12

## 2017-12-12 RX ORDER — MEPERIDINE HYDROCHLORIDE 25 MG/ML
12.5 INJECTION INTRAMUSCULAR; INTRAVENOUS; SUBCUTANEOUS
Status: DISCONTINUED | OUTPATIENT
Start: 2017-12-12 | End: 2017-12-12 | Stop reason: HOSPADM

## 2017-12-12 RX ORDER — FENTANYL CITRATE 50 UG/ML
25-50 INJECTION, SOLUTION INTRAMUSCULAR; INTRAVENOUS
Status: DISCONTINUED | OUTPATIENT
Start: 2017-12-12 | End: 2017-12-12

## 2017-12-12 RX ORDER — HYDROCODONE BITARTRATE AND ACETAMINOPHEN 5; 325 MG/1; MG/1
2 TABLET ORAL
Status: DISCONTINUED | OUTPATIENT
Start: 2017-12-12 | End: 2017-12-12 | Stop reason: HOSPADM

## 2017-12-12 RX ORDER — NALOXONE HYDROCHLORIDE 0.4 MG/ML
.1-.4 INJECTION, SOLUTION INTRAMUSCULAR; INTRAVENOUS; SUBCUTANEOUS
Status: DISCONTINUED | OUTPATIENT
Start: 2017-12-12 | End: 2017-12-12

## 2017-12-12 RX ORDER — PROPOFOL 10 MG/ML
INJECTION, EMULSION INTRAVENOUS CONTINUOUS PRN
Status: DISCONTINUED | OUTPATIENT
Start: 2017-12-12 | End: 2017-12-12

## 2017-12-12 RX ORDER — CEFAZOLIN SODIUM 2 G/100ML
2 INJECTION, SOLUTION INTRAVENOUS
Status: DISCONTINUED | OUTPATIENT
Start: 2017-12-12 | End: 2017-12-12 | Stop reason: HOSPADM

## 2017-12-12 RX ADMIN — SODIUM CHLORIDE, POTASSIUM CHLORIDE, SODIUM LACTATE AND CALCIUM CHLORIDE: 600; 310; 30; 20 INJECTION, SOLUTION INTRAVENOUS at 12:51

## 2017-12-12 RX ADMIN — FENTANYL CITRATE 50 MCG: 50 INJECTION, SOLUTION INTRAMUSCULAR; INTRAVENOUS at 13:29

## 2017-12-12 RX ADMIN — PROPOFOL 150 MCG/KG/MIN: 10 INJECTION, EMULSION INTRAVENOUS at 13:33

## 2017-12-12 RX ADMIN — LIDOCAINE HYDROCHLORIDE 1 ML: 10 INJECTION, SOLUTION EPIDURAL; INFILTRATION; INTRACAUDAL; PERINEURAL at 12:51

## 2017-12-12 RX ADMIN — MIDAZOLAM 2 MG: 1 INJECTION INTRAMUSCULAR; INTRAVENOUS at 13:29

## 2017-12-12 RX ADMIN — FENTANYL CITRATE 50 MCG: 50 INJECTION, SOLUTION INTRAMUSCULAR; INTRAVENOUS at 13:33

## 2017-12-12 RX ADMIN — PROPOFOL 50 MG: 10 INJECTION, EMULSION INTRAVENOUS at 13:33

## 2017-12-12 RX ADMIN — LIDOCAINE HYDROCHLORIDE,EPINEPHRINE BITARTRATE 10 ML: 10; .01 INJECTION, SOLUTION INFILTRATION; PERINEURAL at 13:50

## 2017-12-12 NOTE — IP AVS SNAPSHOT
[facility]    [address] [address]    Phone:  [phone]                                       After Visit Summary   12/12/2017    Nehemias Dillon    MRN: [id number]           After Visit Summary Signature Page     I have received my discharge instructions, and my questions have been answered. I have discussed any challenges I see with this plan with the nurse or doctor.    ..........................................................................................................................................  Patient/Patient Representative Signature      ..........................................................................................................................................  Patient Representative Print Name and Relationship to Patient    ..................................................               ................................................  Date                                            Time    ..........................................................................................................................................  Reviewed by Signature/Title    ...................................................              ..............................................  Date                                                            Time

## 2017-12-12 NOTE — BRIEF OP NOTE
[facility] Brief Operative Note    Pre-operative diagnosis: hx mpolyps, internal hemorrhoids with bleeding, scalp mass   Post-operative diagnosis    Procedure: 1) Excision of sq scalp mass [id number]  2) Colonoscopy  3) Internal hemorrhoid banding   Surgeon: Reilly Finney MD, FACS   Assistants(s): None   Estimated blood loss: Minimal    Specimens: Scalp mass   Findings: 1.5x1x1 cm sq scalp mass - probable sebaceous cyst.  Internal hemorrhoids.  Diverticulosis     Reilly Finney MD, FACS    #[id number]

## 2017-12-12 NOTE — ANESTHESIA POSTPROCEDURE EVALUATION
Patient: Nehemias Dillon    Procedure(s):  colonoscopy, internal hemorrhoid banding, excision scalp mass - Wound Class: II-Clean Contaminated       - Wound Class: I-Clean    Diagnosis:hx mpolyps, internal hemorrhoids with bleeding, scalp mass  Diagnosis Additional Information: No value filed.    Anesthesia Type:  MAC    Note:  Anesthesia Post Evaluation    Patient location during evaluation: Phase 2 and Bedside  Patient participation: Able to fully participate in evaluation  Level of consciousness: awake and alert  Pain management: adequate  Airway patency: patent  Cardiovascular status: acceptable  Respiratory status: acceptable  Hydration status: acceptable  PONV: none     Anesthetic complications: None    Comments: Pt pleased with his care today. VSS. DC to home        Last vitals:  Vitals:    12/12/17 1237   BP: 126/79   Resp: 16   Temp: 97.8  F (36.6  C)   SpO2: 99%         Electronically Signed By: CARIE Hurd CRNA  December 12, 2017  1:12 PM

## 2017-12-12 NOTE — IP AVS SNAPSHOT
MRN:[id number]                      After Visit Summary   12/12/2017    Nehemias Dillon    MRN: [id number]           Thank you!     Thank you for choosing [facility] for your care. Our goal is always to provide you with excellent care. Hearing back from our patients is one way we can continue to improve our services. Please take a few minutes to complete the written survey that you may receive in the mail after you visit with us. Thank you!        Patient Information     Date Of Birth          1965        About your hospital stay     You were admitted on:  December 12, 2017 You last received care in the:  [facility]    You were discharged on:  December 12, 2017       Who to Call     For medical emergencies, please call 911.  For non-urgent questions about your medical care, please call your primary care provider or clinic, [phone]  For questions related to your surgery, please call your surgery clinic        Attending Provider     Provider Specialty    Reilly Finney MD General Surgery       Primary Care Provider Office Phone # Fax #    Luke Rodriguez [phone]      After Care Instructions     Diet Instructions       Resume pre-procedure diet            Discharge Instructions       Follow up appointment as instructed by Surgeon and or RN            Do not - immerse incision in water until sutures removed       Do not immerse incision in water until sutures removed            Dressing       Keep dressing clean and dry.  Dressing / incisional care as instructed by RN and or Surgeon            Ice to affected area       Ice to operative site PRN            No Alcohol       For 24 hours post procedure            No driving or operating machinery        until the day after procedure            Shower       No shower for 24 hours post procedure. May shower Postoperative Day (POD)  2                  Your next 10 appointments already scheduled     Dec  18, 2017  7:30 AM CST   Return Visit with Reilly Finney MD   [facility] ([facility])    [address]  [facility] [address] [phone]              Further instructions from your care team         [facility]    Home Care Following Endoscopy          Activity:    You have just undergone an endoscopic procedure usually performed with conscious sedation.  Do not work or operate machinery (including a car) for at least 12 hours.      I encourage you to walk and attempt to pass this air as soon as possible.    Diet:    Return to the diet you were on before your procedure but eat lightly for the first 12-24 hours.    Drink plenty of water.    Resume any regular medications unless otherwise advised by your physician.  Please begin any new medication prescribed as a result of your procedure as directed by your physician.     If you had any biopsy or polyp removed please refrain from aspirin or aspirin products for 2 days.  If on Coumadin please restart as instructed by your physician.   Pain:    You may take Tylenol as needed for pain.  Expected Recovery:    You can expect some mild abdominal fullness and/or discomfort due to the air used to inflate your intestinal tract. It is also normal to have a mild sore throat after upper endoscopy.    Call Your Physician if You Have:    After Upper Endoscopy:  o Shoulder, back or chest pain.  o Difficulty breathing or swallowing.  o Vomiting blood.    After Colonoscopy:  o Worsening persisting abdominal pain which is worse with activity.  o Fevers (>101 degrees F), chills or shakes.  o Passage of continued blood with bowel movements.   Any questions or concerns about your recovery, please call [phone] or after hours [phone] Nurse Advice Line.    Follow-up Care:    You should receive a call or letter with your results within 1 week. Please call if you have not received a notification of your results.    If asked  to return to clinic please make an appointment 1 week after your procedure.  Call [phone]. (Note:  Dr. Baum requests a return appointment if specimens were taken.)  [facility]   Adult Discharge Orders & Instructions     For 24 hours after surgery    1. Get plenty of rest.  A responsible adult must stay with you for at least 24 hours after you leave the hospital.   2. Do not drive or use heavy equipment.  If you have weakness or tingling, don't drive or use heavy equipment until this feeling goes away.  3. Do not drink alcohol.  4. Avoid strenuous or risky activities.  Ask for help when climbing stairs.   5. You may feel lightheaded.  IF so, sit for a few minutes before standing.  Have someone help you get up.   6. If you have nausea (feel sick to your stomach): Drink only clear liquids such as apple juice, ginger ale, broth or 7-Up.  Rest may also help.  Be sure to drink enough fluids.  Move to a regular diet as you feel able.  7. You may have a slight fever. Call the doctor if your fever is over 100 F (37.7 C) (taken under the tongue) or lasts longer than 24 hours.  8. You may have a dry mouth, a sore throat, muscle aches or trouble sleeping.  These should go away after 24 hours.  9. Do not make important or legal decisions.   Call your doctor for any of the followin.  Signs of infection (fever, growing tenderness at the surgery site, a large amount of drainage or bleeding, severe pain, foul-smelling drainage, redness, swelling).    2. It has been over 8 to 10 hours since surgery and you are still not able to urinate (pass water).    3.  Headache for over 24 hours.    .      Pending Results     Date and Time Order Name Status Description    2017 1349 Surgical pathology exam In process             Admission Information     Date & Time Provider Department Dept. Phone    2017 Reilly Finney MD [facility] [phone]      Your Vitals Were     Blood Pressure  "Temperature Respirations Height Weight Pulse Oximetry    138/89 97.8  F (36.6  C) (Oral) 16 1.689 m (5' 6.5\") 115.7 kg (255 lb) 96%    BMI (Body Mass Index)                   40.54 kg/m2           MyChart Information     [facility] gives you secure access to your electronic health record. If you see a primary care provider, you can also send messages to your care team and make appointments. If you have questions, please call your primary care clinic.  If you do not have a primary care provider, please call [phone] and they will assist you.        Care EveryWhere ID     This is your Care EveryWhere ID. This could be used by other organizations to access your [facility] medical records  [id number]        Equal Access to Services     ROSALEE STRONG : Marta Zhu, keila bates, migel wagoner, shannan joiner. So [facility] [phone].    ATENCIÓN: Si habla español, tiene a anthony disposición servicios gratuitos de asistencia lingüística. Llame al [phone].    We comply with applicable federal civil rights laws and [place] laws. We do not discriminate on the basis of race, color, national origin, age, disability, sex, sexual orientation, or gender identity.               Review of your medicines      START taking        Dose / Directions    HYDROcodone-acetaminophen 5-325 MG per tablet   Commonly known as:  NORCO   Used for:  Acute post-operative pain        Dose:  1-2 tablet   Take 1-2 tablets by mouth every 4 hours as needed for other (Moderate to Severe Pain)   Quantity:  15 tablet   Refills:  0         CONTINUE these medicines which have NOT CHANGED        Dose / Directions    acetaminophen 325 MG tablet   Commonly known as:  TYLENOL        Dose:  650 mg   Take 2 tablets (650 mg) by mouth every 4 hours as needed for mild pain   Quantity:  100 tablet   Refills:  0       atorvastatin 20 MG tablet   Commonly known as:  LIPITOR   Used for:  Hyperlipidemia LDL goal " <130        Dose:  20 mg   Take 1 tablet (20 mg) by mouth daily   Quantity:  90 tablet   Refills:  1            Where to get your medicines      Some of these will need a paper prescription and others can be bought over the counter. Ask your nurse if you have questions.     Bring a paper prescription for each of these medications     HYDROcodone-acetaminophen 5-325 MG per tablet                Protect others around you: Learn how to safely use, store and throw away your medicines at www.disposemymeds.org.             Medication List: This is a list of all your medications and when to take them. Check marks below indicate your daily home schedule. Keep this list as a reference.      Medications           Morning Afternoon Evening Bedtime As Needed    acetaminophen 325 MG tablet   Commonly known as:  TYLENOL   Take 2 tablets (650 mg) by mouth every 4 hours as needed for mild pain                                atorvastatin 20 MG tablet   Commonly known as:  LIPITOR   Take 1 tablet (20 mg) by mouth daily                                HYDROcodone-acetaminophen 5-325 MG per tablet   Commonly known as:  NORCO   Take 1-2 tablets by mouth every 4 hours as needed for other (Moderate to Severe Pain)

## 2017-12-12 NOTE — ANESTHESIA CARE TRANSFER NOTE
Patient: Nehemias Dillon    Procedure(s):  colonoscopy, internal hemorrhoid banding, excision scalp mass - Wound Class: II-Clean Contaminated       - Wound Class: I-Clean    Diagnosis: hx mpolyps, internal hemorrhoids with bleeding, scalp mass  Diagnosis Additional Information: No value filed.    Anesthesia Type:   MAC     Note:  Airway :Face Mask  Patient transferred to:Phase II  Handoff Report: Identifed the Patient, Identified the Reponsible Provider, Reviewed the pertinent medical history, Discussed the surgical course, Reviewed Intra-OP anesthesia mangement and issues during anesthesia, Set expectations for post-procedure period and Allowed opportunity for questions and acknowledgement of understanding      Vitals: (Last set prior to Anesthesia Care Transfer)    CRNA VITALS  12/12/2017 1400 - 12/12/2017 1430      12/12/2017             SpO2: 99 %                Electronically Signed By: CARIE Hurd CRNA  December 12, 2017  2:30 PM

## 2017-12-12 NOTE — ANESTHESIA CARE TRANSFER NOTE
Patient: Nehemias Dillon    Procedure(s):  colonoscopy, internal hemorrhoid banding, excision scalp mass - Wound Class: II-Clean Contaminated       - Wound Class: I-Clean    Diagnosis: hx mpolyps, internal hemorrhoids with bleeding, scalp mass  Diagnosis Additional Information: No value filed.    Anesthesia Type:   MAC     Note:  Airway :Face Mask  Patient transferred to:Phase II  Handoff Report: Identifed the Patient, Identified the Reponsible Provider, Reviewed the pertinent medical history, Discussed the surgical course, Reviewed Intra-OP anesthesia mangement and issues during anesthesia, Set expectations for post-procedure period and Allowed opportunity for questions and acknowledgement of understanding      Vitals: (Last set prior to Anesthesia Care Transfer)    CRNA VITALS  12/12/2017 1353 - 12/12/2017 1453      12/12/2017             SpO2: 99 %                Electronically Signed By: CARIE Hurd CRNA  December 12, 2017  2:55 PM

## 2017-12-12 NOTE — ANESTHESIA PREPROCEDURE EVALUATION
Anesthesia Evaluation     . Pt has had prior anesthetic. Type: General and MAC           ROS/MED HX    ENT/Pulmonary:     (+)sleep apnea, tobacco use, Current use 1 ppd packs/day  uses CPAP , . .    Neurologic:  - neg neurologic ROS     Cardiovascular:     (+) ----. : . . . :. . Previous cardiac testing Echodate:2016results:ECHO STRESS WITH OPTISON   Status: Final result   Visible to patient: Yes (MyChart) Next appt: 2017 at 07:30 AM in Surgery (Reilly Finney MD) Order: [id number]      Details     Reading Physician Reading Date Result Priority    Yessy Aguero MD 2016        Narrative          Interpretation Summary                    [facility]  Echocardiography Laboratory  [address] [place] [address]        Name: GLENNA HAYWARD  MRN: [id number]  : 1965  Study Date: 2016 08:46 AM  Age: 51 yrs  Gender: Male  Patient Location: [facility]  Reason For Study: Chest Pain  History: Hyperlipidemia,Obesity,Anxiety,Obstructive Sleep Apnea  Ordering Physician: ALESHA GUTIERREZ  Referring Physician: Luke Rodriguez  Performed By: Meghna Dobson     BSA: 2.1 m2  Height: 66 in  Weight: 230 lb  HR: 58  BP: 106/72 mmHg  ______________________________________________________________________________        Procedure  Stress Echo Complete. Contrast Optison.  ______________________________________________________________________________     Interpretation Summary     Technically, this is a suboptimal stress test due to the less than targeted  maximum heart rate (82% of predicted heart rate rather than 85%). At this  level and after 9 minutes on the Hero protocol, the stress echo did not  demonstrate evidence of ischemia.  Exercise and EKG portion reported separately.     Contrast was used without apparent complications.  ______________________________________________________________________________     Stress  The patient exhibited no chest pain during  exercise.  Exercise was stopped due to fatigue.  Suboptimal stress test due to inadequate maximum heart rate.  Exercise and EKG portion reported separately.  This was a normal stress echocardiogram with no evidence of stress-induced  ischemia.  The visual ejection fraction is estimated at 65-70%.     Baseline  Normal left ventricular wall motion.  The visual ejection fraction is estimated at 55-60%.     Stress Results         Protocol: Hero Protocol         Maximum Predicted HR:  171 bpm        Target HR:     145 bpm% Max           imum Predicted HR:   82 %                        +---------+--------+----------+------+                     :  Stage  :Duration:Heart Rate:BP    :                     :         : (mm:ss):   (bpm)  :      :                     +---------+--------+----------+------+                     : Stage 1 :  3:00 10      9   :116/78:                     +---------+--------+----------+------+                     : Stage 2 :  3:00 11      7   :122/80:                     +---------+--------+----------+------+                     : Stage 3 :  1:33 12      9   :/     :                     +---------+--------+----------+------+                     : Stage 4 :  1:27 14      1   :132/80:                     +---------+--------+----------+------+                     :Recovery :  5:16 78          :110/80:                     +---------+--------+----------+------+             Stress Duration:  9:00 mm:ss        Recovery Time: 5:16 mm:ss         Maximum Stress HR:   141 bpmM              ETS:         12        Mitral Valve  There is trace mitral regurgitation.     Tricuspid Valve  There is trace to mild tricuspid regurgitation. The right ventricular  systolic pressure is approximated at 20mmHg plus the right atrial pressure.     Aortic Valve  No aortic regurgitation is present. No hemodynamically significant valvular  aortic  stenosis.  ______________________________________________________________________________           Doppler Measurements & Calculations  TR max adrienne: 223.0 cm/sec  TR max P.9 mmHg              ______________________________________________________________________________        Report approved by: Serafin Lizarraga 2016 10:10 AM          Specimen Collected: 16  8:46 AM Last Resulted: 16 10:10 AM Order Details View Encounter Lab and Collection Details Routing Result History               date: results:ECG reviewed date:2017 results:NSR date: results:          METS/Exercise Tolerance:     Hematologic:  - neg hematologic  ROS       Musculoskeletal:   (+) arthritis, , , -       GI/Hepatic:     (+) bowel prep,       Renal/Genitourinary:  - ROS Renal section negative       Endo:     (+) type II DM Last HgA1c: 5.8 date: 10/31/2017 Obesity, .      Psychiatric:  - neg psychiatric ROS       Infectious Disease:  - neg infectious disease ROS       Malignancy:      - no malignancy   Other:    - neg other ROS                 Physical Exam  Normal systems: cardiovascular, pulmonary and dental    Airway   Mallampati: II  TM distance: >3 FB  Neck ROM: limited    Dental     Cardiovascular   Rhythm and rate: regular and normal      Pulmonary    breath sounds clear to auscultation                    Anesthesia Plan      History & Physical Review  History and physical reviewed and following examination; no interval change.    ASA Status:  3 .    NPO Status:  > 8 hours    Plan for MAC with Intravenous induction. Maintenance will be TIVA.  Reason for MAC:  Deep or markedly invasive procedure (G8)  PONV prophylaxis:  Ondansetron (or other 5HT-3)       Postoperative Care  Postoperative pain management:  IV analgesics.      Consents  Anesthetic plan, risks, benefits and alternatives discussed with:  Patient.  Use of blood products discussed: No .   .                          .

## 2017-12-12 NOTE — DISCHARGE INSTRUCTIONS
[facility]    Home Care Following Endoscopy          Activity:    You have just undergone an endoscopic procedure usually performed with conscious sedation.  Do not work or operate machinery (including a car) for at least 12 hours.      I encourage you to walk and attempt to pass this air as soon as possible.    Diet:    Return to the diet you were on before your procedure but eat lightly for the first 12-24 hours.    Drink plenty of water.    Resume any regular medications unless otherwise advised by your physician.  Please begin any new medication prescribed as a result of your procedure as directed by your physician.     If you had any biopsy or polyp removed please refrain from aspirin or aspirin products for 2 days.  If on Coumadin please restart as instructed by your physician.   Pain:    You may take Tylenol as needed for pain.  Expected Recovery:    You can expect some mild abdominal fullness and/or discomfort due to the air used to inflate your intestinal tract. It is also normal to have a mild sore throat after upper endoscopy.    Call Your Physician if You Have:    After Upper Endoscopy:  o Shoulder, back or chest pain.  o Difficulty breathing or swallowing.  o Vomiting blood.    After Colonoscopy:  o Worsening persisting abdominal pain which is worse with activity.  o Fevers (>101 degrees F), chills or shakes.  o Passage of continued blood with bowel movements.   Any questions or concerns about your recovery, please call [phone] or after hours [phone] Nurse Advice Line.    Follow-up Care:    You should receive a call or letter with your results within 1 week. Please call if you have not received a notification of your results.    If asked to return to clinic please make an appointment 1 week after your procedure.  Call [phone]. (Note:  Dr. Baum requests a return appointment if specimens were taken.)  [facility]   Adult Discharge Orders & Instructions     For  24 hours after surgery    1. Get plenty of rest.  A responsible adult must stay with you for at least 24 hours after you leave the hospital.   2. Do not drive or use heavy equipment.  If you have weakness or tingling, don't drive or use heavy equipment until this feeling goes away.  3. Do not drink alcohol.  4. Avoid strenuous or risky activities.  Ask for help when climbing stairs.   5. You may feel lightheaded.  IF so, sit for a few minutes before standing.  Have someone help you get up.   6. If you have nausea (feel sick to your stomach): Drink only clear liquids such as apple juice, ginger ale, broth or 7-Up.  Rest may also help.  Be sure to drink enough fluids.  Move to a regular diet as you feel able.  7. You may have a slight fever. Call the doctor if your fever is over 100 F (37.7 C) (taken under the tongue) or lasts longer than 24 hours.  8. You may have a dry mouth, a sore throat, muscle aches or trouble sleeping.  These should go away after 24 hours.  9. Do not make important or legal decisions.   Call your doctor for any of the followin.  Signs of infection (fever, growing tenderness at the surgery site, a large amount of drainage or bleeding, severe pain, foul-smelling drainage, redness, swelling).    2. It has been over 8 to 10 hours since surgery and you are still not able to urinate (pass water).    3.  Headache for over 24 hours.    .

## 2017-12-13 NOTE — OP NOTE
DATE OF PROCEDURE:  12/12/2017      PREOPERATIVE DIAGNOSIS:  History of colonic polyps, internal hemorrhoids with bleeding and scalp mass.      POSTOPERATIVE DIAGNOSIS:  History of colonic polyps, internal hemorrhoids with bleeding and scalp mass.      PROCEDURE:   1.  Excision of subcutaneous scalp mass measuring 1.5 x 1 x 1 cm in size.   2.  Colonoscopy.   3.  Internal hemorrhoid banding.      SURGEON:  Reilly Finney MD      ANESTHESIA:  MAC.      INDICATIONS FOR PROCEDURE:  Nehemias Dillon is a 52-year-old gentleman who has a history of diverticulosis and had sigmoid resection who returned with some rectal bleeding.  He also has a history of colonic polyps for which he knew was due for a repeat colonoscopy and also at that time he noticed a scalp mass he wanted excised.  This was on the hairline and bothered him quite a bit.      OPERATIVE FINDINGS:  Included 1.5 x 1 x 1 cm subcutaneous scalp mass, probable sebaceous cyst, internal hemorrhoids and diverticulosis.      DETAILS OF PROCEDURE:  With the cooperation of the patient, the scalp mass was marked in the preoperative holding area, he was then taken to the procedure room and placed on the stretcher in the left lateral decubitus position.  After adequate sedation was achieved, the area around the mass was infiltrated with local anesthetic.  A transverse incision was then made.  Subcutaneous tissue was opened using cautery.  The mass was encountered.  It was a thin shiny wall.  This was dissected free using Metzenbaum scissors and submitted as specimen.  Hemostasis was achieved using cautery.  Subcutaneous tissue was reapproximated using 3-0 Vicryl and the skin was closed using a running 4-0 Monocryl subcuticular stitch.  The patient was then repositioned.  The colonoscopy was then carried out and this dictation is in a separate system under [employer].  After completing the colonoscopy was noted to have some diverticulosis.  We then carried out a digital rectal  exam with the patient in the left lateral decubitus position.  Anoscope was inserted.  The hemorrhoids could be seen at the 7 o'clock position in lithotomy.  This was the most likely source of bleed.  A suction band was then placed and the hemorrhoids were banded without difficulty.  We then reinspected and no other hemorrhoids were amenable to banding.  The patient was then taken from the procedure room back to [facility] in stable condition to be sent home.         TIKI ELIZALDE MD             D: 2017 14:34   T: 2017 20:56   MT: [id number]      Name:     LGENNA HAYWARD   MRN:      [id number]        Account:        [id number]   :      1965           Procedure Date: 2017      Document: [id number]

## 2017-12-15 LAB — COPATH REPORT: NORMAL

## 2017-12-18 ENCOUNTER — OFFICE VISIT (OUTPATIENT)
Dept: SURGERY | Facility: OTHER | Age: 52
End: 2017-12-18
Payer: COMMERCIAL

## 2017-12-18 VITALS — HEART RATE: 72 BPM | WEIGHT: 254 LBS | BODY MASS INDEX: 40.38 KG/M2 | TEMPERATURE: 97.7 F

## 2017-12-18 DIAGNOSIS — Z98.890 POST-OPERATIVE STATE: Primary | ICD-10-CM

## 2017-12-18 PROCEDURE — 99024 POSTOP FOLLOW-UP VISIT: CPT | Performed by: SPECIALIST

## 2017-12-18 NOTE — PROGRESS NOTES
F/u for colonoscopy, hemorrhoidal banding and excision of scalp lesion      Subjective:  Pt feels good.  No complaints.  No further bleeding    Objective:  B/P: Data Unavailable, T: 97.7, P: 72, R: Data Unavailable  HEENT: Scalp incision healing well    Colonscopy - diverticulosis    Path -   SPECIMEN(S):   Subcutaneous scalp mass     FINAL DIAGNOSIS:   Subcutaneous scalp mass:   - Epithelial inclusion cyst.     Electronically signed out by:     MARLINE Johnson M.D.       Assessment/Plan:  Pt s/p colonoscopy and hemorrhoid banding.  No further bleeding. Now some recurrent diverticulosis after colon resection - urged to improve diet.   Scalp mass an epithelial inclusion cyst.  Repeat colonoscopy 5 years    Reilly Finney MD, FACS

## 2017-12-18 NOTE — LETTER
12/18/2017         RE: Nehemias Dillon  [address] [place] [address]        Dear Colleague,    Thank you for referring your patient, Nehemias Dillon, to the [facility]. Please see a copy of my visit note below.    F/u for colonoscopy, hemorrhoidal banding and excision of scalp lesion      Subjective:  Pt feels good.  No complaints.  No further bleeding    Objective:  B/P: Data Unavailable, T: 97.7, P: 72, R: Data Unavailable  HEENT: Scalp incision healing well    Colonscopy - diverticulosis    Path -   SPECIMEN(S):   Subcutaneous scalp mass     FINAL DIAGNOSIS:   Subcutaneous scalp mass:   - Epithelial inclusion cyst.     Electronically signed out by:     MARLINE Johnson M.D.       Assessment/Plan:  Pt s/p colonoscopy and hemorrhoid banding.  No further bleeding. Now some recurrent diverticulosis after colon resection - urged to improve diet.   Scalp mass an epithelial inclusion cyst.  Repeat colonoscopy 5 years    Reilly Finney MD, FACS    Again, thank you for allowing me to participate in the care of your patient.        Sincerely,        Reilly Finney MD

## 2017-12-18 NOTE — MR AVS SNAPSHOT
After Visit Summary   12/18/2017    Nehemias Dillon    MRN: [id number]           Patient Information     Date Of Birth          1965        Visit Information        Provider Department      12/18/2017 7:30 AM Reilly Finney MD [facility]        Today's Diagnoses     Post-operative state    -  1      Care Instructions    SMOKING CESSATION    What's in cigarette smoke? - Cigarette smoke contains over 4,000 chemicals. Nicotine is one of the main ingredients which is an insecticide/herbicide. It is poisonous to our nervous system, increases blood clotting risk, and decreases the body's defenses to fight off infection. Another chemical is Carbon Monoxide is an asphyxiating gas that permanently binds to blood cells and blocks the transport of oxygen. This leads to tissue death and decreases your metabolism. Tar is a chemical that coats your lungs and trachea which impairs new oxygen coming in and carbon dioxide getting out of your body.   How does smoking impact surgery? - Smoking is particularly hazardous with regards to surgery. Surgery puts stress on the body and a smoker's body is already under strain from these chemicals. Putting the two together, especially for an elective surgery, could be a recipe for disaster. Smoking before and after surgery increases your risk of heart problems, slow wound healing, delayed bone healing, blood clots, wound infection and anesthesia complications.   What are the benefits of quitting? - In 20 minutes your blood pressure will drop back down to normal. In 8 hours the carbon monoxide (a toxic gas) levels in your blood stream will drop by half, and oxygen levels will return to normal. In 48 hours your chance of having a heart attack will have decreased. All nicotine will have left your body. Your sense of taste and smell will return to a normal level. In 72 hours your bronchial tubes will relax, and your energy levels will increase. In 2 weeks your  circulation will increase, and it will continue to improve for the next 10 weeks.    Recommendations for elective surgery - Ideally, patients should quit smoking 8 weeks before and at least 2 weeks after elective surgery in order to avoid complications. Simply cutting back on the amount of cigarettes smoked per day does not offer any benefit or decrease the risk of poor wound healing, heart problems, and infection. Smokers should also start taking Vitamin C and B for two weeks before surgery and two weeks after surgery.    Ways to Stop Smokin. Nicotine patches, lozenges, or gum  2. Support Groups  3. Medications (see below)    List of Medications:  1. Varenicline Tartrate (CHANTIX)   2. Bupropion HCL (WELLBUTRIN, ZYBAN) - note: make sure Wellbutrin is for smoking cessation and not other issues   3. Nicotine Patch (NICODERM)   4. Nicotine Inhaler (NICOTROL)   5. Nicotine Gum Nicotine Polacrilex   6. Nicotine Lozenge: Nicotine Polacrilex (COMMIT)   * [place] offers a smoking support group as well!  Please visit: [email or web address]  If you are interested in these, ask about getting a prescription or talk to your primary care doctor about what may be the best way for you to quit.                 Follow-ups after your visit        Who to contact     If you have questions or need follow up information about today's clinic visit or your schedule please contact [facility] directly at [phone].  Normal or non-critical lab and imaging results will be communicated to you by MyChart, letter or phone within 4 business days after the clinic has received the results. If you do not hear from us within 7 days, please contact the clinic through [employer] or phone. If you have a critical or abnormal lab result, we will notify you by phone as soon as possible.  Submit refill requests through [employer] or call your pharmacy and they will forward the refill request to us. Please allow 3 business  days for your refill to be completed.          Additional Information About Your Visit        MyChart Information     [email or web address] gives you secure access to your electronic health record. If you see a primary care provider, you can also send messages to your care team and make appointments. If you have questions, please call your primary care clinic.  If you do not have a primary care provider, please call [phone] and they will assist you.        Care EveryWhere ID     This is your Care EveryWhere ID. This could be used by other organizations to access your [place] medical records  [id number]        Your Vitals Were     Pulse Temperature BMI (Body Mass Index)             72 97.7  F (36.5  C) (Temporal) 40.38 kg/m2          Blood Pressure from Last 3 Encounters:   12/12/17 135/75   12/11/17 136/80   10/31/17 136/80    Weight from Last 3 Encounters:   12/18/17 254 lb (115.2 kg)   12/12/17 255 lb (115.7 kg)   12/11/17 255 lb (115.7 kg)              Today, you had the following     No orders found for display       Primary Care Provider Office Phone # Fax #    Lkue José Rodriguez [phone] [address] [place] [address]        Equal Access to Services     ALLY STRONG AH: Hadii aad ku hadasho Soomaali, waaxda luqadaha, qaybta kaalmada adeegyada, waxay rejiin haymariluzn kaleigh quiroz lamoises joiner. So [facility] [phone].    ATENCIÓN: Si habla español, tiene a anthony disposición servicios gratuitos de asistencia lingüística. Llame al [phone].    We comply with applicable federal civil rights laws and [place] laws. We do not discriminate on the basis of race, color, national origin, age, disability, sex, sexual orientation, or gender identity.            Thank you!     Thank you for choosing [facility]  for your care. Our goal is always to provide you with excellent care. Hearing back from our patients is one way we can continue to improve our services. Please take a few minutes  to complete the written survey that you may receive in the mail after your visit with us. Thank you!             Your Updated Medication List - Protect others around you: Learn how to safely use, store and throw away your medicines at www.disposemymeds.org.          This list is accurate as of: 12/18/17  8:35 AM.  Always use your most recent med list.                   Brand Name Dispense Instructions for use Diagnosis    acetaminophen 325 MG tablet    TYLENOL    100 tablet    Take 2 tablets (650 mg) by mouth every 4 hours as needed for mild pain        atorvastatin 20 MG tablet    LIPITOR    90 tablet    Take 1 tablet (20 mg) by mouth daily    Hyperlipidemia LDL goal <130       HYDROcodone-acetaminophen 5-325 MG per tablet    NORCO    15 tablet    Take 1-2 tablets by mouth every 4 hours as needed for other (Moderate to Severe Pain)    Acute post-operative pain

## 2017-12-18 NOTE — NURSING NOTE
"Chief Complaint   Patient presents with     Surgical Followup     DOS 12- Colonoscopy       Initial Pulse 72  Temp 97.7  F (36.5  C) (Temporal)  Wt 254 lb (115.2 kg)  BMI 40.38 kg/m2 Estimated body mass index is 40.38 kg/(m^2) as calculated from the following:    Height as of 12/12/17: 5' 6.5\" (1.689 m).    Weight as of this encounter: 254 lb (115.2 kg).  Medication Reconciliation: complete    "

## 2019-06-17 PROBLEM — J30.2 SEASONAL ALLERGIC RHINITIS: Status: ACTIVE | Noted: 2017-10-31

## 2019-06-28 ENCOUNTER — HOSPITAL ENCOUNTER (EMERGENCY)
Facility: CLINIC | Age: 54
Discharge: HOME | End: 2019-06-28
Attending: FAMILY MEDICINE | Admitting: FAMILY MEDICINE
Payer: COMMERCIAL

## 2019-06-28 ENCOUNTER — APPOINTMENT (OUTPATIENT)
Dept: GENERAL RADIOLOGY | Facility: CLINIC | Age: 54
End: 2019-06-28
Attending: FAMILY MEDICINE
Payer: COMMERCIAL

## 2019-06-28 VITALS
SYSTOLIC BLOOD PRESSURE: 135 MMHG | RESPIRATION RATE: 26 BRPM | OXYGEN SATURATION: 95 % | WEIGHT: 231.56 LBS | TEMPERATURE: 98.2 F | HEART RATE: 69 BPM | BODY MASS INDEX: 36.82 KG/M2 | DIASTOLIC BLOOD PRESSURE: 80 MMHG

## 2019-06-28 DIAGNOSIS — R55 SYNCOPE, UNSPECIFIED SYNCOPE TYPE: ICD-10-CM

## 2019-06-28 DIAGNOSIS — R07.89 MUSCULAR CHEST PAIN: ICD-10-CM

## 2019-06-28 LAB
ALBUMIN SERPL-MCNC: 3.8 G/DL (ref 3.4–5)
ALP SERPL-CCNC: 78 U/L (ref 40–150)
ALT SERPL W P-5'-P-CCNC: 25 U/L (ref 0–70)
ANION GAP SERPL CALCULATED.3IONS-SCNC: 8 MMOL/L (ref 3–14)
AST SERPL W P-5'-P-CCNC: 20 U/L (ref 0–45)
BASOPHILS # BLD AUTO: 0.1 10E9/L (ref 0–0.2)
BASOPHILS NFR BLD AUTO: 0.6 %
BILIRUB SERPL-MCNC: 0.4 MG/DL (ref 0.2–1.3)
BUN SERPL-MCNC: 16 MG/DL (ref 7–30)
CALCIUM SERPL-MCNC: 8.8 MG/DL (ref 8.5–10.1)
CHLORIDE SERPL-SCNC: 107 MMOL/L (ref 94–109)
CO2 SERPL-SCNC: 27 MMOL/L (ref 20–32)
CREAT SERPL-MCNC: 0.83 MG/DL (ref 0.66–1.25)
DIFFERENTIAL METHOD BLD: NORMAL
EOSINOPHIL NFR BLD AUTO: 2 %
ERYTHROCYTE [DISTWIDTH] IN BLOOD BY AUTOMATED COUNT: 13 % (ref 10–15)
GFR SERPL CREATININE-BSD FRML MDRD: >90 ML/MIN/{1.73_M2}
GLUCOSE SERPL-MCNC: 102 MG/DL (ref 70–99)
HCT VFR BLD AUTO: 43.6 % (ref 40–53)
HGB BLD-MCNC: 14.8 G/DL (ref 13.3–17.7)
IMM GRANULOCYTES # BLD: 0 10E9/L (ref 0–0.4)
IMM GRANULOCYTES NFR BLD: 0.4 %
LIPASE SERPL-CCNC: 99 U/L (ref 73–393)
LYMPHOCYTES # BLD AUTO: 2.4 10E9/L (ref 0.8–5.3)
LYMPHOCYTES NFR BLD AUTO: 27.8 %
MCH RBC QN AUTO: 31 PG (ref 26.5–33)
MCHC RBC AUTO-ENTMCNC: 33.9 G/DL (ref 31.5–36.5)
MCV RBC AUTO: 91 FL (ref 78–100)
MONOCYTES # BLD AUTO: 0.8 10E9/L (ref 0–1.3)
MONOCYTES NFR BLD AUTO: 9 %
NEUTROPHILS # BLD AUTO: 5.1 10E9/L (ref 1.6–8.3)
NEUTROPHILS NFR BLD AUTO: 60.2 %
NRBC # BLD AUTO: 0 10*3/UL
NRBC BLD AUTO-RTO: 0 /100
NT-PROBNP SERPL-MCNC: 26 PG/ML (ref 0–900)
PLATELET # BLD AUTO: 264 10E9/L (ref 150–450)
POTASSIUM SERPL-SCNC: 4 MMOL/L (ref 3.4–5.3)
PROT SERPL-MCNC: 7 G/DL (ref 6.8–8.8)
RBC # BLD AUTO: 4.78 10E12/L (ref 4.4–5.9)
SODIUM SERPL-SCNC: 142 MMOL/L (ref 133–144)
TROPONIN I SERPL-MCNC: <0.015 UG/L (ref 0–0.04)
TROPONIN I SERPL-MCNC: <0.015 UG/L (ref 0–0.04)
WBC # BLD AUTO: 8.4 10E9/L (ref 4–11)

## 2019-06-28 PROCEDURE — 93005 ELECTROCARDIOGRAM TRACING: CPT

## 2019-06-28 PROCEDURE — 85025 COMPLETE CBC W/AUTO DIFF WBC: CPT | Performed by: FAMILY MEDICINE

## 2019-06-28 PROCEDURE — 96361 HYDRATE IV INFUSION ADD-ON: CPT

## 2019-06-28 PROCEDURE — 99285 EMERGENCY DEPT VISIT HI MDM: CPT | Mod: 25

## 2019-06-28 PROCEDURE — 25800030 ZZH RX IP 258 OP 636: Performed by: FAMILY MEDICINE

## 2019-06-28 PROCEDURE — 83690 ASSAY OF LIPASE: CPT | Performed by: FAMILY MEDICINE

## 2019-06-28 PROCEDURE — 93010 ELECTROCARDIOGRAM REPORT: CPT | Mod: Z6 | Performed by: FAMILY MEDICINE

## 2019-06-28 PROCEDURE — 84484 ASSAY OF TROPONIN QUANT: CPT | Performed by: FAMILY MEDICINE

## 2019-06-28 PROCEDURE — 99285 EMERGENCY DEPT VISIT HI MDM: CPT | Mod: 25 | Performed by: FAMILY MEDICINE

## 2019-06-28 PROCEDURE — 71046 X-RAY EXAM CHEST 2 VIEWS: CPT | Mod: TC

## 2019-06-28 PROCEDURE — 83880 ASSAY OF NATRIURETIC PEPTIDE: CPT | Performed by: FAMILY MEDICINE

## 2019-06-28 PROCEDURE — 96374 THER/PROPH/DIAG INJ IV PUSH: CPT

## 2019-06-28 PROCEDURE — 25000132 ZZH RX MED GY IP 250 OP 250 PS 637: Performed by: FAMILY MEDICINE

## 2019-06-28 PROCEDURE — 25000128 H RX IP 250 OP 636: Performed by: FAMILY MEDICINE

## 2019-06-28 PROCEDURE — 80053 COMPREHEN METABOLIC PANEL: CPT | Performed by: FAMILY MEDICINE

## 2019-06-28 RX ORDER — KETOROLAC TROMETHAMINE 30 MG/ML
30 INJECTION, SOLUTION INTRAMUSCULAR; INTRAVENOUS ONCE
Status: COMPLETED | OUTPATIENT
Start: 2019-06-28 | End: 2019-06-28

## 2019-06-28 RX ORDER — SODIUM CHLORIDE 9 MG/ML
1000 INJECTION, SOLUTION INTRAVENOUS CONTINUOUS
Status: DISCONTINUED | OUTPATIENT
Start: 2019-06-28 | End: 2019-06-28 | Stop reason: HOSPADM

## 2019-06-28 RX ORDER — ONDANSETRON 2 MG/ML
4 INJECTION INTRAMUSCULAR; INTRAVENOUS EVERY 30 MIN PRN
Status: DISCONTINUED | OUTPATIENT
Start: 2019-06-28 | End: 2019-06-28 | Stop reason: HOSPADM

## 2019-06-28 RX ORDER — ASPIRIN 81 MG/1
324 TABLET, CHEWABLE ORAL ONCE
Status: COMPLETED | OUTPATIENT
Start: 2019-06-28 | End: 2019-06-28

## 2019-06-28 RX ADMIN — SODIUM CHLORIDE 1000 ML: 9 INJECTION, SOLUTION INTRAVENOUS at 18:10

## 2019-06-28 RX ADMIN — SODIUM CHLORIDE 1000 ML: 9 INJECTION, SOLUTION INTRAVENOUS at 16:55

## 2019-06-28 RX ADMIN — KETOROLAC TROMETHAMINE 30 MG: 30 INJECTION, SOLUTION INTRAMUSCULAR at 18:10

## 2019-06-28 RX ADMIN — ASPIRIN 81 MG 324 MG: 81 TABLET ORAL at 16:56

## 2019-06-28 NOTE — ED PROVIDER NOTES
History     Chief Complaint   Patient presents with     Chest Pain     The history is provided by the patient.     Nehemias Dillon is a 54 year old male who presents to the emergency department with chest pain after a syncopal episode. The patient was outside today and was putting together a hose when he suddenly passed out. His daughter was working with him and watched him fall. She reports that he was unconscious for about 10 seconds. After waking up, the patient felt confused for a while. He started to realize that his chest was hurting and he was nauseated. He told his daughter to bring him to the hospital because he has never had anything like this before. Patient is still having some slight chest pain, but is not nauseated. He is slightly dizzy and lightheaded. Patient has pain in his right shoulder from falling onto it. He has had previous surgery on this shoulder. Patient denies any new back pain. No diarrhea. No alcohol use today. He has been eating and drinking normally. He notes that he has been feeling fatigued lately. Patient has never had an episode like this before. He notes that he thought he had chest pain three years ago, but was found to just be anxiety.     Allergies:  Allergies   Allergen Reactions     Bees [Bees]      Nkda [No Known Drug Allergies]        Problem List:    Patient Active Problem List    Diagnosis Date Noted     Diverticulitis of sigmoid colon 06/06/2012     Priority: High     History of diverticulosis 10/31/2017     Priority: Medium     External hemorrhoids 10/31/2017     Priority: Medium     H/O partial resection of colon 10/31/2017     Priority: Medium     Sebaceous cyst 10/31/2017     Priority: Medium     TORI (obstructive sleep apnea) 10/31/2017     Priority: Medium     Inguinal hernia, left 10/31/2017     Priority: Medium     Seasonal allergic rhinitis, unspecified chronicity, unspecified trigger 10/31/2017     Priority: Medium     Acute chest pain 08/30/2016     Priority:  Medium     Tobacco use disorder 08/30/2016     Priority: Medium     Hidradenitis suppurativa 12/21/2012     Priority: Medium     Suppurative hidradenitis 12/21/2012     Priority: Medium     Diverticulitis of colon 06/25/2012     Priority: Medium     Hyperlipidemia LDL goal <130 03/11/2011     Priority: Medium     Allergic to bees 05/06/2009     Priority: Medium     Obstructive sleep apnea 05/01/2009     Priority: Medium     Carpal tunnel syndrome 03/24/2009     Priority: Medium     (Problem list name updated by automated process. Provider to review and confirm.)       Sleep disorder 03/24/2009     Priority: Medium     Class 2 obesity due to excess calories without serious comorbidity with body mass index (BMI) of 39.0 to 39.9 in adult 03/24/2009     Priority: Medium     Pure hyperglyceridemia 02/04/2005     Priority: Medium     Other and unspecified derangement of medial meniscus 07/05/2002     Priority: Medium     Inguinal hernia 06/06/2012     Priority: Low     Anxiety 07/15/2009     Priority: Low        Past Medical History:    Past Medical History:   Diagnosis Date     Diverticulitis      DM type 2 (diabetes mellitus, type 2) (H)      Hypertriglyceridemia      Lumbago      TORI (obstructive sleep apnea) 10/31/2017     Prostatitis, unspecified        Past Surgical History:    Past Surgical History:   Procedure Laterality Date     ARTHROPLASTY SHOULDER      right     CARPAL TUNNEL RELEASE RT/LT  2012    left     COLONOSCOPY N/A 12/12/2017    Procedure: COLONOSCOPY;  colonoscopy, internal hemorrhoid banding, excision scalp mass;  Surgeon: Reilly Finney MD;  Location:  GI     EXCISE MASS HEAD N/A 12/12/2017    Procedure: EXCISE MASS HEAD;;  Surgeon: Reilly Finney MD;  Location: [facility]     HC KNEE SCOPE,MED/LAT MENISECTOMY  07/08/2002    Right knee arthroscopy, lateral menisectomy     HC REPAIR ING HERNIA,5+Y/O,REDUCIBL  1997    Marlex mesh repair of right indirect inguinal hernia     HEMORRHOIDECTOMY BANDING  N/A 12/12/2017    Procedure: HEMORRHOIDECTOMY BANDING;;  Surgeon: Reilly Finney MD;  Location: [facility]     SIGMOIDECTOMY         Family History:    Family History   Adopted: Yes   Problem Relation Age of Onset     Unknown/Adopted Mother      Unknown/Adopted Father      Unknown/Adopted Maternal Grandmother      Unknown/Adopted Maternal Grandfather      Unknown/Adopted Paternal Grandmother      Unknown/Adopted Paternal Grandfather      Unknown/Adopted Brother      Unknown/Adopted Sister        Social History:  Marital Status:   [2]  Social History     Tobacco Use     Smoking status: Current Every Day Smoker     Packs/day: 1.50     Years: 20.00     Pack years: 30.00     Types: Cigarettes     Smokeless tobacco: Never Used     Tobacco comment: smokes 1 pack daily again since 6-09   Substance Use Topics     Alcohol use: Yes     Comment: one drink rarely     Drug use: No        Medications:      acetaminophen (TYLENOL) 325 MG tablet   atorvastatin (LIPITOR) 20 MG tablet   HYDROcodone-acetaminophen (NORCO) 5-325 MG per tablet         Review of Systems   All other systems reviewed and are negative.      Physical Exam   BP: (!) 153/99  Pulse: 81  Temp: 98.2  F (36.8  C)  Resp: 16  Weight: 105 kg (231 lb 9 oz)  SpO2: 97 %      Physical Exam   Constitutional: He appears well-developed and well-nourished. No distress.   HENT:   Head: Normocephalic and atraumatic.   Right Ear: External ear normal.   Left Ear: External ear normal.   Eyes: Pupils are equal, round, and reactive to light. Right eye exhibits no discharge. Left eye exhibits no discharge. No scleral icterus.   Neck: Normal range of motion. Neck supple.   Cardiovascular: Normal rate, regular rhythm and normal heart sounds. Exam reveals no gallop and no friction rub.   No murmur heard.  Pulmonary/Chest: Effort normal and breath sounds normal. No stridor. No respiratory distress. He has no wheezes. He has no rales. He exhibits no tenderness.   Abdominal: Soft. He  exhibits no distension and no mass. There is no tenderness. There is no rebound and no guarding. No hernia.   Musculoskeletal: Normal range of motion. He exhibits no edema or tenderness.   Neurological: He is alert. No cranial nerve deficit.   Skin: Skin is warm and dry. No rash noted. He is not diaphoretic. No erythema. No pallor.   Psychiatric: He has a normal mood and affect. His behavior is normal. Judgment and thought content normal.   Nursing note and vitals reviewed.      ED Course        Procedures  Results for orders placed or performed during the hospital encounter of 06/28/19   XR Chest 2 Views    Narrative    CHEST TWO VIEWS June 28, 2019 5:06 PM     HISTORY: Chest pain.    COMPARISON: 8/30/2016.    FINDINGS: The heart size is normal. The lungs are clear. No  pneumothorax or pleural effusion.       Impression    IMPRESSION: No acute abnormality.    ROB SALMON MD   CBC with platelets differential   Result Value Ref Range    WBC 8.4 4.0 - 11.0 10e9/L    RBC Count 4.78 4.4 - 5.9 10e12/L    Hemoglobin 14.8 13.3 - 17.7 g/dL    Hematocrit 43.6 40.0 - 53.0 %    MCV 91 78 - 100 fl    MCH 31.0 26.5 - 33.0 pg    MCHC 33.9 31.5 - 36.5 g/dL    RDW 13.0 10.0 - 15.0 %    Platelet Count 264 150 - 450 10e9/L    Diff Method Automated Method     % Neutrophils 60.2 %    % Lymphocytes 27.8 %    % Monocytes 9.0 %    % Eosinophils 2.0 %    % Basophils 0.6 %    % Immature Granulocytes 0.4 %    Nucleated RBCs 0 0 /100    Absolute Neutrophil 5.1 1.6 - 8.3 10e9/L    Absolute Lymphocytes 2.4 0.8 - 5.3 10e9/L    Absolute Monocytes 0.8 0.0 - 1.3 10e9/L    Absolute Basophils 0.1 0.0 - 0.2 10e9/L    Abs Immature Granulocytes 0.0 0 - 0.4 10e9/L    Absolute Nucleated RBC 0.0    Comprehensive metabolic panel   Result Value Ref Range    Sodium 142 133 - 144 mmol/L    Potassium 4.0 3.4 - 5.3 mmol/L    Chloride 107 94 - 109 mmol/L    Carbon Dioxide 27 20 - 32 mmol/L    Anion Gap 8 3 - 14 mmol/L    Glucose 102 (H) 70 - 99 mg/dL    Urea  Nitrogen 16 7 - 30 mg/dL    Creatinine 0.83 0.66 - 1.25 mg/dL    GFR Estimate >90 >60 mL/min/[1.73_m2]    GFR Estimate If Black >90 >60 mL/min/[1.73_m2]    Calcium 8.8 8.5 - 10.1 mg/dL    Bilirubin Total 0.4 0.2 - 1.3 mg/dL    Albumin 3.8 3.4 - 5.0 g/dL    Protein Total 7.0 6.8 - 8.8 g/dL    Alkaline Phosphatase 78 40 - 150 U/L    ALT 25 0 - 70 U/L    AST 20 0 - 45 U/L   Lipase   Result Value Ref Range    Lipase 99 73 - 393 U/L   Troponin I   Result Value Ref Range    Troponin I ES <0.015 0.000 - 0.045 ug/L   Nt probnp inpatient (BNP)   Result Value Ref Range    N-Terminal Pro BNP Inpatient 26 0 - 900 pg/mL   Troponin I (second draw)   Result Value Ref Range    Troponin I ES <0.015 0.000 - 0.045 ug/L          EKG Interpretation:      Interpreted by Santana Stoll  Time reviewed: now   Symptoms at time of EKG: now   Rhythm: normal sinus   Rate: normal  Axis: NORMAL  Ectopy: none  Conduction: normal  ST Segments/ T Waves: No ST-T wave changes  Q Waves: none  Comparison to prior: No old EKG available    Clinical Impression: normal EKG  Labs are reviewed and were unremarkable.  Initial troponin was negative, I did obtain a 3-hour troponin and this was also negative.  I think the patient most likely had a vasovagal syncopal episode.  The chest pain appears to be more muscular in nature as he has had some chronic shoulder issues in the past.  At this point patient is improved since being here.  He has had no symptoms and he has been monitored on the cardiac monitor during this period of time.  I think it is safe to discharge the patient home.  He states that he did have a stress test done about 4 years ago, I would recommend that he get into his doctor and see about having a repeat stress test done next week.  Patient will return if his chest pain returns or any other new symptoms develop.    Assessments & Plan (with Medical Decision Making)  Syncope, muscular chest pain     I have reviewed the nursing notes.    I  have reviewed the findings, diagnosis, plan and need for follow up with the patient.            This document serves as a record of services personally performed by Santana Stoll, *. It was created on their behalf by Rosa Lao, a trained medical scribe. The creation of this record is based on the provider's personal observations and the statements of the patient. This document has been checked and approved by the attending provider.  Note: Chart documentation done in part with Dragon Voice Recognition software. Although reviewed after completion, some word and grammatical errors may remain.    6/28/2019   [facility] EMERGENCY DEPARTMENT     Santana Stoll MD  06/28/19 1761

## 2019-06-28 NOTE — ED AVS SNAPSHOT
[facility] Emergency Department  [address] [place] [address]  Phone:  [phone]  Fax:  [phone]                                    Nehemias Dillon   MRN: [id number]    Department:  [facility] Emergency Department   Date of Visit:  6/28/2019           After Visit Summary Signature Page    I have received my discharge instructions, and my questions have been answered. I have discussed any challenges I see with this plan with the nurse or doctor.    ..........................................................................................................................................  Patient/Patient Representative Signature      ..........................................................................................................................................  Patient Representative Print Name and Relationship to Patient    ..................................................               ................................................  Date                                   Time    ..........................................................................................................................................  Reviewed by Signature/Title    ...................................................              ..............................................  Date                                               Time          22EPIC Rev 08/18

## 2019-09-24 NOTE — H&P (VIEW-ONLY)
[address]  [facility] [address]  [phone]  Dept: [phone]    PRE-OP EVALUATION:  Today's date: 2017    Nehemias Dillon (: 1965) presents for pre-operative evaluation assessment as requested by Dr. Finney.  He requires evaluation and anesthesia risk assessment prior to undergoing surgery/procedure for treatment of left scalp mass, internal hemorrhoids with bleeding, diverticulitis with history of colon resection, colonic polyps.  Proposed procedure: colonoscopy, internal hemorrhoid banding, and scalp mass excision   Date of Surgery/ Procedure: 17  Time of Surgery/ Procedure: 12:30pm  Hospital/Surgical Facility: [facility]  Primary Physician: Luke Rodriguez  Type of Anesthesia Anticipated: General    Patient has a Health Care Directive or Living Will:  NO    Preop Questions 2017   1.  Do you have a history of heart attack, stroke, stent, bypass or surgery on an artery in the head, neck, heart or legs? No   2.  Do you ever have any pain or discomfort in your chest? No   3.  Do you have a history of  Heart Failure? No   4.   Are you troubled by shortness of breath when:  walking on a level surface, or up a slight hill, or at night? No   5.  Do you currently have a cold, bronchitis or other respiratory infection? No   6.  Do you have a cough, shortness of breath, or wheezing? No   7.  Do you sometimes get pains in the calves of your legs when you walk? No   8. Do you or anyone in your family have previous history of blood clots? No   9.  Do you or does anyone in your family have a serious bleeding problem such as prolonged bleeding following surgeries or cuts? No   10. Have you ever had problems with anemia or been told to take iron pills? No   11. Have you had any abnormal blood loss such as black, tarry or bloody stools? No   12. Have you ever had a blood transfusion? No   13. Have you or any of your relatives ever had problems with  anesthesia? No   14. Do you have sleep apnea, excessive snoring or daytime drowsiness? No   15. Do you have any prosthetic heart valves? No   16. Do you have prosthetic joints? No           HPI:                                                      Brief HPI related to upcoming procedure:     1.   - After routine physical, found to have left scalp/neck mass. Patient saw general surgery for evaluation. Mass likely to be sebaceous cyst vs lipoma. Will be removed due to chronic changes (bigger and smaller, sometimes with yellow & smelly discharge, gets painful and red).     2.   - Patient with history of diverticulitis with sigmoid resection several years ago. Now with chronic rectal bleeding thought to be due to internal hemorrhoids found on last colonoscopy. These will be banded to decrease bleeding. He is also due for routine colonoscopy due to history of polyps and his diverticulitis.          See problem list for active medical problems.  Problems all longstanding and stable, except as noted/documented.  See ROS for pertinent symptoms related to these conditions.                                                                                                      HYPERLIPIDEMIA - Patient has a history of hyperlipidemia requiring medication for treatment with recent good control. Patient reports no problems or side effects with the medication.                                                                                                                                                          MEDICAL HISTORY:                                                    Patient Active Problem List    Diagnosis Date Noted     Diverticulitis of sigmoid colon 06/06/2012     Priority: High     History of diverticulosis 10/31/2017     Priority: Medium     External hemorrhoids 10/31/2017     Priority: Medium     H/O partial resection of colon 10/31/2017     Priority: Medium     Sebaceous cyst 10/31/2017     Priority: Medium     TORI  (obstructive sleep apnea) 10/31/2017     Priority: Medium     Inguinal hernia, left 10/31/2017     Priority: Medium     Seasonal allergic rhinitis, unspecified chronicity, unspecified trigger 10/31/2017     Priority: Medium     Acute chest pain 08/30/2016     Priority: Medium     Tobacco use disorder 08/30/2016     Priority: Medium     Hidradenitis suppurativa 12/21/2012     Priority: Medium     Suppurative hidradenitis 12/21/2012     Priority: Medium     Diverticulitis of colon 06/25/2012     Priority: Medium     Hyperlipidemia LDL goal <130 03/11/2011     Priority: Medium     Allergic to bees 05/06/2009     Priority: Medium     Obstructive sleep apnea 05/01/2009     Priority: Medium     Carpal tunnel syndrome 03/24/2009     Priority: Medium     (Problem list name updated by automated process. Provider to review and confirm.)       Sleep disorder 03/24/2009     Priority: Medium     Class 2 obesity due to excess calories without serious comorbidity with body mass index (BMI) of 39.0 to 39.9 in adult 03/24/2009     Priority: Medium     Pure hyperglyceridemia 02/04/2005     Priority: Medium     Other and unspecified derangement of medial meniscus 07/05/2002     Priority: Medium     Inguinal hernia 06/06/2012     Priority: Low     Anxiety 07/15/2009     Priority: Low      Past Medical History:   Diagnosis Date     Diverticulitis      DM type 2 (diabetes mellitus, type 2) (H)     Diet resolved DM and diagnosis eliminated from problem list  3/2013     Hypertriglyceridemia      Lumbago     Hx of injury with low back pain & radiculitis, resolved with conservative therapy     TORI (obstructive sleep apnea) 10/31/2017     Prostatitis, unspecified      Past Surgical History:   Procedure Laterality Date     ARTHROPLASTY SHOULDER      right     CARPAL TUNNEL RELEASE RT/LT  2012    left     HC KNEE SCOPE,MED/LAT MENISECTOMY  07/08/2002    Right knee arthroscopy, lateral menisectomy      REPAIR ING HERNIA,5+Y/O,REDUCIBL  1997     Marlex mesh repair of right indirect inguinal hernia     SIGMOIDECTOMY       Current Outpatient Prescriptions   Medication Sig Dispense Refill     atorvastatin (LIPITOR) 20 MG tablet Take 1 tablet (20 mg) by mouth daily 90 tablet 1     acetaminophen (TYLENOL) 325 MG tablet Take 2 tablets (650 mg) by mouth every 4 hours as needed for mild pain 100 tablet      OTC products: None, except as noted above    Allergies   Allergen Reactions     Bees [Bees]      Nkda [No Known Drug Allergies]       Latex Allergy: NO    Social History   Substance Use Topics     Smoking status: Current Every Day Smoker     Packs/day: 1.50     Years: 20.00     Types: Cigarettes     Smokeless tobacco: Never Used      Comment: smokes 1 pack daily again since 6-09     Alcohol use Yes      Comment: one drink rarely     History   Drug Use No       REVIEW OF SYSTEMS:                                                    Constitutional, neuro, ENT, endocrine, pulmonary, cardiac, gastrointestinal, genitourinary, musculoskeletal, integument and psychiatric systems are negative, except as otherwise noted.      EXAM:                                                    /80 (BP Location: Right arm, Patient Position: Chair, Cuff Size: Adult Regular)  Pulse 86  Temp 98.3  F (36.8  C) (Oral)  Resp 20  Wt 255 lb (115.7 kg)  SpO2 99%  BMI 39.94 kg/m2    GENERAL APPEARANCE: healthy, alert and no distress     EYES: EOMI,  PERRL     HENT: ear canals and TM's normal and nose and mouth without ulcers or lesions     NECK: Large 2x2 cm mass on left neck/scalp behind left ear at scalp mass and edge of hairline, non-tender, not warm, soft, movable, no adenopathy, no asymmetry, or scars and thyroid normal to palpation     RESP: lungs clear to auscultation - no rales, rhonchi or wheezes     CV: regular rates and rhythm, normal S1 S2, no S3 or S4 and no murmur, click or rub     MS: extremities normal- no gross deformities noted, no evidence of inflammation in  joints, FROM in all extremities.     SKIN: no suspicious lesions or rashes     NEURO: Normal strength and tone, sensory exam grossly normal, mentation intact and speech normal     PSYCH: mentation appears normal. and affect normal/bright    DIAGNOSTICS:                                                    EKG: appears normal, NSR, normal axis, normal intervals, no acute ST/T changes c/w ischemia, no LVH by voltage criteria, unchanged from previous tracings    Recent Labs   Lab Test  10/31/17   1634  08/29/16   2335  08/26/16   1627   HGB  15.8  15.4   --    PLT  254  275   --    INR   --   1.06   --    NA  137  140  142   POTASSIUM  4.3  3.9  4.1   CR  0.87  0.78  0.88   A1C  5.8   --   5.5        IMPRESSION:                                                    Reason for surgery/procedure: Left scalp/neck mass, internal hemorrhoids with bleeding, history of diverticulitis with sigmoid resection, history of colonic polyps  Diagnosis/reason for consult: Pre operative consult     The proposed surgical procedure is considered INTERMEDIATE risk.    REVISED CARDIAC RISK INDEX  The patient has the following serious cardiovascular risks for perioperative complications such as (MI, PE, VFib and 3  AV Block):  No serious cardiac risks  INTERPRETATION: 0 risks: Class I (very low risk - 0.4% complication rate)    The patient has the following additional risks for perioperative complications:  No identified additional risks      ICD-10-CM    1. Preop general physical exam Z01.818 EKG 12-lead complete w/read - Clinics   2. Scalp mass R22.0    3. Internal hemorrhoid, bleeding K64.8    4. Diverticulitis of sigmoid colon K57.32    5. H/O partial resection of colon Z90.49    6. Hyperlipidemia LDL goal <130 E78.5    7. Obstructive sleep apnea G47.33    8. Tobacco use disorder F17.200    9. Class 2 obesity due to excess calories without serious comorbidity with body mass index (BMI) of 39.0 to 39.9 in adult E66.09     Z68.39         RECOMMENDATIONS:                                                        Obstructive Sleep Apnea (or suspected sleep apnea)  Patient is clearly advised to use their home CPAP when released from surgery    -- Patient is to take all scheduled medications on the day of surgery EXCEPT for modifications listed below.    -- Reviewed colonoscopy prep, patient to remain on clear diet until surgery, surgery at 12:30, advised to call surgeon to clarify if can have clear liquids typically until 4-6 hours before surgery     APPROVAL GIVEN to proceed with proposed procedure, without further diagnostic evaluation       Signed Electronically by: Amanda Wallace-KAYLEIGH Solitario    Copy of this evaluation report is provided to requesting physician.    [facility] Preop Guidelines   Applied

## 2019-09-28 ENCOUNTER — HEALTH MAINTENANCE LETTER (OUTPATIENT)
Age: 54
End: 2019-09-28

## 2019-12-19 ENCOUNTER — ALLIED HEALTH/NURSE VISIT (OUTPATIENT)
Dept: FAMILY MEDICINE | Facility: OTHER | Age: 54
End: 2019-12-19
Payer: COMMERCIAL

## 2019-12-19 VITALS — SYSTOLIC BLOOD PRESSURE: 112 MMHG | RESPIRATION RATE: 16 BRPM | HEART RATE: 70 BPM | DIASTOLIC BLOOD PRESSURE: 64 MMHG

## 2019-12-19 DIAGNOSIS — Z01.30 BLOOD PRESSURE CHECK: Primary | ICD-10-CM

## 2019-12-19 PROCEDURE — 99207 ZZC NO CHARGE NURSE ONLY: CPT

## 2019-12-19 NOTE — PROGRESS NOTES
Patient stopped into clinic today for a blood pressure check. Patient states that he has been feeling off since yesterday. Patient has been having ringing in his ears, chest feels tight and shoulders hurt. Patient was sick 1 1/2 weeks ago with sinus issues but says this has improved. No coughing, SOB or fevers. Patients blood pressure today was 112/64, R16, P 70. I spoke with provider, recommends making an appointment tomorrow or if worsens should be seen in the ER.

## 2020-01-30 NOTE — ED NOTES
Patient denies any pain at this time and is waiting for repeat Troponin. He is playing [employer] on his cell phone. His wife states she is concerned that he is repeating himself and doesn't seem to be hearing right. Dr Stoll updated and to patients room .   Spoke with patient regarding pap, patient verbalized understanding, no further questions.

## 2021-01-09 ENCOUNTER — HEALTH MAINTENANCE LETTER (OUTPATIENT)
Age: 56
End: 2021-01-09

## 2021-10-23 ENCOUNTER — HEALTH MAINTENANCE LETTER (OUTPATIENT)
Age: 56
End: 2021-10-23

## 2022-02-12 ENCOUNTER — HEALTH MAINTENANCE LETTER (OUTPATIENT)
Age: 57
End: 2022-02-12

## 2022-04-21 ENCOUNTER — OFFICE VISIT (OUTPATIENT)
Dept: FAMILY MEDICINE | Facility: OTHER | Age: 57
End: 2022-04-21
Payer: COMMERCIAL

## 2022-04-21 VITALS
HEIGHT: 67 IN | TEMPERATURE: 96.7 F | BODY MASS INDEX: 38.3 KG/M2 | WEIGHT: 244 LBS | RESPIRATION RATE: 20 BRPM | DIASTOLIC BLOOD PRESSURE: 86 MMHG | HEART RATE: 72 BPM | SYSTOLIC BLOOD PRESSURE: 134 MMHG | OXYGEN SATURATION: 94 %

## 2022-04-21 DIAGNOSIS — Z11.59 NEED FOR HEPATITIS C SCREENING TEST: ICD-10-CM

## 2022-04-21 DIAGNOSIS — K57.30 DIVERTICULOSIS OF LARGE INTESTINE WITHOUT HEMORRHAGE: ICD-10-CM

## 2022-04-21 DIAGNOSIS — G56.00 CARPAL TUNNEL SYNDROME, UNSPECIFIED LATERALITY: ICD-10-CM

## 2022-04-21 DIAGNOSIS — Z00.00 ROUTINE HISTORY AND PHYSICAL EXAMINATION OF ADULT: Primary | ICD-10-CM

## 2022-04-21 DIAGNOSIS — G47.33 OSA (OBSTRUCTIVE SLEEP APNEA): ICD-10-CM

## 2022-04-21 DIAGNOSIS — Z72.0 TOBACCO ABUSE: ICD-10-CM

## 2022-04-21 DIAGNOSIS — Z12.5 SCREENING FOR PROSTATE CANCER: ICD-10-CM

## 2022-04-21 DIAGNOSIS — M77.12 LEFT TENNIS ELBOW: ICD-10-CM

## 2022-04-21 DIAGNOSIS — J30.89 SEASONAL ALLERGIC RHINITIS DUE TO OTHER ALLERGIC TRIGGER: ICD-10-CM

## 2022-04-21 DIAGNOSIS — K64.4 EXTERNAL HEMORRHOIDS: ICD-10-CM

## 2022-04-21 DIAGNOSIS — Z86.0100 HISTORY OF COLONIC POLYPS: ICD-10-CM

## 2022-04-21 DIAGNOSIS — Z11.4 SCREENING FOR HIV (HUMAN IMMUNODEFICIENCY VIRUS): ICD-10-CM

## 2022-04-21 DIAGNOSIS — E66.01 MORBID OBESITY (H): ICD-10-CM

## 2022-04-21 DIAGNOSIS — E78.5 HYPERLIPIDEMIA LDL GOAL <130: ICD-10-CM

## 2022-04-21 LAB
ALBUMIN SERPL-MCNC: 3.9 G/DL (ref 3.4–5)
ALP SERPL-CCNC: 81 U/L (ref 40–150)
ALT SERPL W P-5'-P-CCNC: 31 U/L (ref 0–70)
ANION GAP SERPL CALCULATED.3IONS-SCNC: 3 MMOL/L (ref 3–14)
AST SERPL W P-5'-P-CCNC: 24 U/L (ref 0–45)
BILIRUB SERPL-MCNC: 0.5 MG/DL (ref 0.2–1.3)
BUN SERPL-MCNC: 15 MG/DL (ref 7–30)
CALCIUM SERPL-MCNC: 8.5 MG/DL (ref 8.5–10.1)
CHLORIDE BLD-SCNC: 109 MMOL/L (ref 94–109)
CHOLEST SERPL-MCNC: 169 MG/DL
CO2 SERPL-SCNC: 27 MMOL/L (ref 20–32)
CREAT SERPL-MCNC: 0.82 MG/DL (ref 0.66–1.25)
ERYTHROCYTE [DISTWIDTH] IN BLOOD BY AUTOMATED COUNT: 13.4 % (ref 10–15)
FASTING STATUS PATIENT QL REPORTED: YES
GFR SERPL CREATININE-BSD FRML MDRD: >90 ML/MIN/1.73M2
GLUCOSE BLD-MCNC: 118 MG/DL (ref 70–99)
HBA1C MFR BLD: 5.8 % (ref 0–5.6)
HCT VFR BLD AUTO: 44.8 % (ref 40–53)
HDLC SERPL-MCNC: 23 MG/DL
HGB BLD-MCNC: 15.4 G/DL (ref 13.3–17.7)
LDLC SERPL CALC-MCNC: 92 MG/DL
MCH RBC QN AUTO: 31.4 PG (ref 26.5–33)
MCHC RBC AUTO-ENTMCNC: 34.4 G/DL (ref 31.5–36.5)
MCV RBC AUTO: 91 FL (ref 78–100)
NONHDLC SERPL-MCNC: 146 MG/DL
PLATELET # BLD AUTO: 262 10E3/UL (ref 150–450)
POTASSIUM BLD-SCNC: 4.8 MMOL/L (ref 3.4–5.3)
PROT SERPL-MCNC: 7.3 G/DL (ref 6.8–8.8)
PSA SERPL-MCNC: 1.53 UG/L (ref 0–4)
RBC # BLD AUTO: 4.91 10E6/UL (ref 4.4–5.9)
SODIUM SERPL-SCNC: 139 MMOL/L (ref 133–144)
TRIGL SERPL-MCNC: 272 MG/DL
WBC # BLD AUTO: 7.2 10E3/UL (ref 4–11)

## 2022-04-21 PROCEDURE — 80061 LIPID PANEL: CPT | Performed by: PHYSICIAN ASSISTANT

## 2022-04-21 PROCEDURE — 36415 COLL VENOUS BLD VENIPUNCTURE: CPT | Performed by: PHYSICIAN ASSISTANT

## 2022-04-21 PROCEDURE — 83036 HEMOGLOBIN GLYCOSYLATED A1C: CPT | Performed by: PHYSICIAN ASSISTANT

## 2022-04-21 PROCEDURE — 99386 PREV VISIT NEW AGE 40-64: CPT | Mod: 25 | Performed by: PHYSICIAN ASSISTANT

## 2022-04-21 PROCEDURE — 99214 OFFICE O/P EST MOD 30 MIN: CPT | Mod: 25 | Performed by: PHYSICIAN ASSISTANT

## 2022-04-21 PROCEDURE — 90750 HZV VACC RECOMBINANT IM: CPT | Performed by: PHYSICIAN ASSISTANT

## 2022-04-21 PROCEDURE — 85027 COMPLETE CBC AUTOMATED: CPT | Performed by: PHYSICIAN ASSISTANT

## 2022-04-21 PROCEDURE — G0103 PSA SCREENING: HCPCS | Performed by: PHYSICIAN ASSISTANT

## 2022-04-21 PROCEDURE — 80053 COMPREHEN METABOLIC PANEL: CPT | Performed by: PHYSICIAN ASSISTANT

## 2022-04-21 PROCEDURE — 90471 IMMUNIZATION ADMIN: CPT | Performed by: PHYSICIAN ASSISTANT

## 2022-04-21 ASSESSMENT — ENCOUNTER SYMPTOMS
CONSTIPATION: 0
FEVER: 0
COUGH: 0
EYE PAIN: 0
NAUSEA: 0
DIARRHEA: 0
DYSURIA: 0
NERVOUS/ANXIOUS: 1
FREQUENCY: 1
CHILLS: 0
PARESTHESIAS: 0
JOINT SWELLING: 1
HEADACHES: 0
HEARTBURN: 0
ARTHRALGIAS: 1
SORE THROAT: 0
DIZZINESS: 0
WEAKNESS: 0
HEMATOCHEZIA: 0
PALPITATIONS: 0
ABDOMINAL PAIN: 0
HEMATURIA: 0
MYALGIAS: 0
SHORTNESS OF BREATH: 0

## 2022-04-21 ASSESSMENT — PAIN SCALES - GENERAL: PAINLEVEL: NO PAIN (0)

## 2022-04-21 NOTE — PROGRESS NOTES
SUBJECTIVE:   CC: Nehemias Dillon is an 57 year old male who presents for preventative health visit.       Patient has been advised of split billing requirements and indicates understanding: Yes  Healthy Habits:     Getting at least 3 servings of Calcium per day:  Yes    Bi-annual eye exam:  Yes    Dental care twice a year:  NO    Sleep apnea or symptoms of sleep apnea:  None    Diet:  Other    Frequency of exercise:  None    Taking medications regularly:  Yes    Medication side effects:  None    PHQ-2 Total Score: 0    Additional concerns today:  No    Today's PHQ-2 Score:   PHQ-2 ( 1999 [employer]) 4/21/2022   Q1: Little interest or pleasure in doing things 0   Q2: Feeling down, depressed or hopeless 0   PHQ-2 Score 0   Q1: Little interest or pleasure in doing things Not at all   Q2: Feeling down, depressed or hopeless Not at all   PHQ-2 Score 0       Abuse: Current or Past(Physical, Sexual or Emotional)- No  Do you feel safe in your environment? Yes    Have you ever done Advance Care Planning? (For example, a Health Directive, POLST, or a discussion with a medical provider or your loved ones about your wishes): No, advance care planning information given to patient to review.  Patient plans to discuss their wishes with loved ones or provider.      Social History     Tobacco Use     Smoking status: Current Every Day Smoker     Packs/day: 1.50     Years: 20.00     Pack years: 30.00     Types: Cigarettes     Smokeless tobacco: Never Used     Tobacco comment: smokes 1 pack daily again since 6-09   Substance Use Topics     Alcohol use: Yes     Comment: one drink rarely         Alcohol Use 4/21/2022   Prescreen: >3 drinks/day or >7 drinks/week? Not Applicable   Prescreen: >3 drinks/day or >7 drinks/week? -       Last PSA:   PSA   Date Value Ref Range Status   10/31/2017 0.84 0 - 4 ug/L Final     Comment:     Assay Method:  Chemiluminescence using Siemens Vista analyzer       Reviewed orders with patient. Reviewed health  maintenance and updated orders accordingly - Yes  Lab work is in process  Labs reviewed in EPIC  BP Readings from Last 3 Encounters:   04/21/22 134/86   12/19/19 112/64   06/28/19 135/80    Wt Readings from Last 3 Encounters:   04/21/22 110.7 kg (244 lb)   06/28/19 105 kg (231 lb 9 oz)   12/18/17 115.2 kg (254 lb)                  Patient Active Problem List   Diagnosis     Other and unspecified derangement of medial meniscus     Pure hyperglyceridemia     Carpal tunnel syndrome     Sleep disorder     Class 2 obesity due to excess calories without serious comorbidity with body mass index (BMI) of 39.0 to 39.9 in adult     Obstructive sleep apnea     Allergic to bees     Anxiety     Hyperlipidemia LDL goal <130     Diverticulitis of sigmoid colon     Inguinal hernia     Diverticulitis of colon     Hidradenitis suppurativa     Suppurative hidradenitis     Acute chest pain     Tobacco use disorder     History of diverticulosis     External hemorrhoids     H/O partial resection of colon     Sebaceous cyst     TORI (obstructive sleep apnea)     Inguinal hernia, left     Seasonal allergic rhinitis, unspecified chronicity, unspecified trigger     Diverticulosis of large intestine without hemorrhage     Morbid obesity (H)     Past Surgical History:   Procedure Laterality Date     ARTHROPLASTY SHOULDER      right     CARPAL TUNNEL RELEASE RT/LT  2012    left     COLONOSCOPY N/A 12/12/2017    Procedure: COLONOSCOPY;  colonoscopy, internal hemorrhoid banding, excision scalp mass;  Surgeon: Reilly Finney MD;  Location: [facility]     EXCISE MASS HEAD N/A 12/12/2017    Procedure: EXCISE MASS HEAD;;  Surgeon: Reilly Finney MD;  Location: [facility]     HC KNEE SCOPE,MED/LAT MENISECTOMY  07/08/2002    Right knee arthroscopy, lateral menisectomy      REPAIR ING HERNIA,5+Y/O,REDUCIBL  1997    Marlex mesh repair of right indirect inguinal hernia     HEMORRHOIDECTOMY BANDING N/A 12/12/2017    Procedure: HEMORRHOIDECTOMY BANDING;;   Surgeon: Reilly Finney MD;  Location:  GI     SIGMOIDECTOMY         Social History     Tobacco Use     Smoking status: Current Every Day Smoker     Packs/day: 1.50     Years: 20.00     Pack years: 30.00     Types: Cigarettes     Smokeless tobacco: Never Used     Tobacco comment: smokes 1 pack daily again since 6-09   Substance Use Topics     Alcohol use: Yes     Comment: one drink rarely     Family History   Adopted: Yes   Problem Relation Age of Onset     Unknown/Adopted Mother      Unknown/Adopted Father      Unknown/Adopted Maternal Grandmother      Unknown/Adopted Maternal Grandfather      Unknown/Adopted Paternal Grandmother      Unknown/Adopted Paternal Grandfather      Unknown/Adopted Brother      Unknown/Adopted Sister          Current Outpatient Medications   Medication Sig Dispense Refill     acetaminophen (TYLENOL) 325 MG tablet Take 2 tablets (650 mg) by mouth every 4 hours as needed for mild pain (Patient not taking: Reported on 4/21/2022) 100 tablet      atorvastatin (LIPITOR) 20 MG tablet Take 1 tablet (20 mg) by mouth daily (Patient not taking: Reported on 4/21/2022) 90 tablet 1     Allergies   Allergen Reactions     Bees [Bees]      Nkda [No Known Drug Allergies]      Recent Labs   Lab Test 06/28/19  1647 10/31/17  1634 09/06/16  0905 08/29/16  2335 08/26/16  1627 10/21/15  1711 06/06/14  1039   A1C  --  5.8  --   --  5.5  --  5.6   LDL  --  110* 67  --  83  --   --    HDL  --  25* 24*  --  19*  --   --    TRIG  --  229* 197*  --  304*  --   --    ALT 25 39  --  28 32   < > 25   CR 0.83 0.87  --  0.78 0.88   < > 0.87   GFRESTIMATED >90 >90  --  >90  Non  GFR Calc   >90  Non  GFR Calc     < > >90   GFRESTBLACK >90 >90  --  >90   GFR Calc   >90   GFR Calc     < > >90   POTASSIUM 4.0 4.3  --  3.9 4.1   < > 4.8   TSH  --   --   --   --  1.94  --   --     < > = values in this interval not displayed.        Reviewed and updated as  needed this visit by clinical staff                    Reviewed and updated as needed this visit by Provider                   Past Medical History:   Diagnosis Date     Diverticulitis      DM type 2 (diabetes mellitus, type 2) (H)     Diet resolved DM and diagnosis eliminated from problem list  3/2013     Hypertriglyceridemia      Lumbago     Hx of injury with low back pain & radiculitis, resolved with conservative therapy     TORI (obstructive sleep apnea) 10/31/2017     Prostatitis, unspecified       Past Surgical History:   Procedure Laterality Date     ARTHROPLASTY SHOULDER      right     CARPAL TUNNEL RELEASE RT/LT  2012    left     COLONOSCOPY N/A 12/12/2017    Procedure: COLONOSCOPY;  colonoscopy, internal hemorrhoid banding, excision scalp mass;  Surgeon: Reilly Finney MD;  Location:  GI     EXCISE MASS HEAD N/A 12/12/2017    Procedure: EXCISE MASS HEAD;;  Surgeon: Reilly Finney MD;  Location:  GI     HC KNEE SCOPE,MED/LAT MENISECTOMY  07/08/2002    Right knee arthroscopy, lateral menisectomy     HC REPAIR ING HERNIA,5+Y/O,REDUCIBL  1997    Marlex mesh repair of right indirect inguinal hernia     HEMORRHOIDECTOMY BANDING N/A 12/12/2017    Procedure: HEMORRHOIDECTOMY BANDING;;  Surgeon: Reilly Finney MD;  Location:  GI     SIGMOIDECTOMY         Review of Systems   Constitutional: Negative for chills and fever.   HENT: Positive for congestion and hearing loss. Negative for ear pain and sore throat.    Eyes: Negative for pain and visual disturbance.   Respiratory: Negative for cough and shortness of breath.    Cardiovascular: Negative for chest pain, palpitations and peripheral edema.   Gastrointestinal: Negative for abdominal pain, constipation, diarrhea, heartburn, hematochezia and nausea.   Genitourinary: Positive for frequency. Negative for dysuria, genital sores, hematuria, impotence, penile discharge and urgency.   Musculoskeletal: Positive for arthralgias and joint swelling. Negative  "for myalgias.   Skin: Negative for rash.   Neurological: Negative for dizziness, weakness, headaches and paresthesias.   Psychiatric/Behavioral: Negative for mood changes. The patient is nervous/anxious.        OBJECTIVE:   /86   Pulse 72   Temp (!) 96.7  F (35.9  C) (Temporal)   Resp 20   Ht 1.689 m (5' 6.5\")   Wt 110.7 kg (244 lb)   SpO2 94%   BMI 38.79 kg/m      Physical Exam  GENERAL: healthy, alert and no distress  EYES: Eyes grossly normal to inspection, PERRL and conjunctivae and sclerae normal  HENT: ear canals and TM's normal, nose and mouth without ulcers or lesions  NECK: no adenopathy, no asymmetry, masses, or scars and thyroid normal to palpation  RESP: lungs clear to auscultation - no rales, rhonchi or wheezes  CV: regular rate and rhythm, normal S1 S2, no S3 or S4, no murmur, click or rub, no peripheral edema and peripheral pulses strong  ABDOMEN: soft, nontender, no hepatosplenomegaly, no masses and bowel sounds normal  MS: no gross musculoskeletal defects noted, no edema  SKIN: no suspicious lesions or rashes  NEURO: Normal strength and tone, mentation intact and speech normal  PSYCH: mentation appears normal, affect normal/bright    Diagnostic Test Results:  Labs reviewed in Epic    ASSESSMENT/PLAN:   Nehemias was seen today for physical.    Diagnoses and all orders for this visit:    Routine history and physical examination of adult  -     CBC with platelets; Future  -     Comprehensive metabolic panel (BMP + Alb, Alk Phos, ALT, AST, Total. Bili, TP); Future  -     Lipid panel reflex to direct LDL Fasting; Future  -     Adult Gastro Ref - Procedure Only; Future  -     CBC with platelets  -     Comprehensive metabolic panel (BMP + Alb, Alk Phos, ALT, AST, Total. Bili, TP)  -     Lipid panel reflex to direct LDL Fasting    Carpal tunnel syndrome, unspecified laterality    TORI (obstructive sleep apnea)    Seasonal allergic rhinitis due to other allergic trigger    External " hemorrhoids    Diverticulosis of large intestine without hemorrhage  -     CBC with platelets; Future  -     Adult Gastro Ref - Procedure Only; Future  -     CBC with platelets    Hyperlipidemia LDL goal <130  -     CBC with platelets; Future  -     Comprehensive metabolic panel (BMP + Alb, Alk Phos, ALT, AST, Total. Bili, TP); Future  -     Lipid panel reflex to direct LDL Fasting; Future  -     Hemoglobin A1c; Future  -     CBC with platelets  -     Comprehensive metabolic panel (BMP + Alb, Alk Phos, ALT, AST, Total. Bili, TP)  -     Lipid panel reflex to direct LDL Fasting  -     Hemoglobin A1c    Screening for HIV (human immunodeficiency virus)    Need for hepatitis C screening test    Left tennis elbow    Morbid obesity (H)  -     Hemoglobin A1c; Future  -     Hemoglobin A1c    Screening for prostate cancer  -     PSA, screen; Future  -     PSA, screen    Tobacco abuse  -     CT Chest Lung Cancer Scrn Low Dose wo; Future    History of colonic polyps  -     Adult Gastro Ref - Procedure Only; Future    Other orders  -     REVIEW OF HEALTH MAINTENANCE PROTOCOL ORDERS  -     ZOSTER VACCINE RECOMBINANT ADJUVANTED IM NJX    57-year-old male here for routine physical.  Repeat in 1 year.    He does have a history of carpal tunnel syndrome more on the left than on the right.  He has developed some left tennis elbow on this side.  Advised a tennis elbow band and careful observation.  Follow-up in 3 to 4 weeks if not improved we will consider occupational therapy.    He continues to work through obstructive sleep apnea and seasonal allergies.  No new concerns here follow-up as needed.    He has had a history of external hemorrhoids and diverticulosis of the sigmoid colon.  He is due for colonoscopy.  Orders are placed.    LDL cholesterol reviewed with him today.  Rechecking related labs.    He declines HIV and hepatitis C testing after discussion around risks and benefits.    Morbid obesity is still present.  Advise  "increased physical exercise on regular basis.      Patient has been advised of split billing requirements and indicates understanding: Yes    COUNSELING:   Reviewed preventive health counseling, as reflected in patient instructions       Regular exercise       Healthy diet/nutrition       Vision screening       Hearing screening       Alcohol Use        Safe sex practices/STD prevention       Consider Hep C screening for all patients one time for ages 18-79 years       HIV screeninx in teen years, 1x in adult years, and at intervals if high risk       Colorectal cancer screening       Consider lung cancer screening for ages 55-80 years (77 for Medicare) and 20 pack-year smoking history     Estimated body mass index is 36.82 kg/m  as calculated from the following:    Height as of 17: 1.689 m (5' 6.5\").    Weight as of 19: 105 kg (231 lb 9 oz).     Weight management plan: Discussed healthy diet and exercise guidelines    He reports that he has been smoking cigarettes. He has a 30.00 pack-year smoking history. He has never used smokeless tobacco.  Tobacco Cessation Action Plan:   Information offered: Patient not interested at this time      Counseling Resources:  ATP IV Guidelines  Pooled Cohorts Equation Calculator  FRAX Risk Assessment  ICSI Preventive Guidelines  Dietary Guidelines for Americans,   USDA's MyPlate  ASA Prophylaxis  Lung CA Screening    Nj Aponte PA-C  [facility]  "

## 2022-10-09 ENCOUNTER — HEALTH MAINTENANCE LETTER (OUTPATIENT)
Age: 57
End: 2022-10-09

## 2023-05-21 ENCOUNTER — HEALTH MAINTENANCE LETTER (OUTPATIENT)
Age: 58
End: 2023-05-21
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
## 2024-05-25 ENCOUNTER — HEALTH MAINTENANCE LETTER (OUTPATIENT)
Age: 59
End: 2024-05-25

## 2025-04-11 ENCOUNTER — APPOINTMENT (OUTPATIENT)
Dept: CT IMAGING | Facility: CLINIC | Age: 60
End: 2025-04-11
Attending: NURSE PRACTITIONER
Payer: COMMERCIAL

## 2025-04-11 ENCOUNTER — HOSPITAL ENCOUNTER (EMERGENCY)
Facility: CLINIC | Age: 60
Discharge: HOME | End: 2025-04-11
Attending: EMERGENCY MEDICINE | Admitting: EMERGENCY MEDICINE
Payer: COMMERCIAL

## 2025-04-11 VITALS
RESPIRATION RATE: 18 BRPM | DIASTOLIC BLOOD PRESSURE: 67 MMHG | OXYGEN SATURATION: 98 % | BODY MASS INDEX: 37.93 KG/M2 | HEART RATE: 60 BPM | WEIGHT: 236 LBS | SYSTOLIC BLOOD PRESSURE: 120 MMHG | TEMPERATURE: 98 F | HEIGHT: 66 IN

## 2025-04-11 DIAGNOSIS — G43.909 MIGRAINE WITHOUT STATUS MIGRAINOSUS, NOT INTRACTABLE, UNSPECIFIED MIGRAINE TYPE: ICD-10-CM

## 2025-04-11 LAB
ANION GAP SERPL CALCULATED.3IONS-SCNC: 12 MMOL/L (ref 7–15)
APTT PPP: 29 SECONDS (ref 22–38)
ATRIAL RATE - MUSE: 67 BPM
BASOPHILS # BLD AUTO: 0 10E3/UL (ref 0–0.2)
BASOPHILS NFR BLD AUTO: 1 %
BUN SERPL-MCNC: 15.8 MG/DL (ref 6–20)
CALCIUM SERPL-MCNC: 9.4 MG/DL (ref 8.8–10.4)
CHLORIDE SERPL-SCNC: 104 MMOL/L (ref 98–107)
CREAT SERPL-MCNC: 0.95 MG/DL (ref 0.67–1.17)
DIASTOLIC BLOOD PRESSURE - MUSE: NORMAL MMHG
EGFRCR SERPLBLD CKD-EPI 2021: >90 ML/MIN/1.73M2
EOSINOPHIL # BLD AUTO: 0.1 10E3/UL (ref 0–0.7)
EOSINOPHIL NFR BLD AUTO: 2 %
ERYTHROCYTE [DISTWIDTH] IN BLOOD BY AUTOMATED COUNT: 12.7 % (ref 10–15)
GLUCOSE SERPL-MCNC: 105 MG/DL (ref 70–99)
HCO3 SERPL-SCNC: 24 MMOL/L (ref 22–29)
HCT VFR BLD AUTO: 46.2 % (ref 40–53)
HGB BLD-MCNC: 16.1 G/DL (ref 13.3–17.7)
HOLD SPECIMEN: NORMAL
HOLD SPECIMEN: NORMAL
IMM GRANULOCYTES # BLD: 0 10E3/UL
IMM GRANULOCYTES NFR BLD: 0 %
INR PPP: 1.07 (ref 0.85–1.15)
INTERPRETATION ECG - MUSE: NORMAL
LYMPHOCYTES # BLD AUTO: 2.1 10E3/UL (ref 0.8–5.3)
LYMPHOCYTES NFR BLD AUTO: 24 %
MCH RBC QN AUTO: 31.1 PG (ref 26.5–33)
MCHC RBC AUTO-ENTMCNC: 34.8 G/DL (ref 31.5–36.5)
MCV RBC AUTO: 89 FL (ref 78–100)
MONOCYTES # BLD AUTO: 0.8 10E3/UL (ref 0–1.3)
MONOCYTES NFR BLD AUTO: 9 %
NEUTROPHILS # BLD AUTO: 5.8 10E3/UL (ref 1.6–8.3)
NEUTROPHILS NFR BLD AUTO: 65 %
NRBC # BLD AUTO: 0 10E3/UL
NRBC BLD AUTO-RTO: 0 /100
P AXIS - MUSE: 47 DEGREES
PLATELET # BLD AUTO: 276 10E3/UL (ref 150–450)
POTASSIUM SERPL-SCNC: 4.5 MMOL/L (ref 3.4–5.3)
PR INTERVAL - MUSE: 168 MS
QRS DURATION - MUSE: 86 MS
QT - MUSE: 442 MS
QTC - MUSE: 467 MS
R AXIS - MUSE: 45 DEGREES
RBC # BLD AUTO: 5.18 10E6/UL (ref 4.4–5.9)
SODIUM SERPL-SCNC: 140 MMOL/L (ref 135–145)
SYSTOLIC BLOOD PRESSURE - MUSE: NORMAL MMHG
T AXIS - MUSE: 23 DEGREES
TROPONIN T SERPL HS-MCNC: 6 NG/L
VENTRICULAR RATE- MUSE: 67 BPM
WBC # BLD AUTO: 8.8 10E3/UL (ref 4–11)

## 2025-04-11 PROCEDURE — 70496 CT ANGIOGRAPHY HEAD: CPT

## 2025-04-11 PROCEDURE — 85004 AUTOMATED DIFF WBC COUNT: CPT | Performed by: EMERGENCY MEDICINE

## 2025-04-11 PROCEDURE — 258N000003 HC RX IP 258 OP 636: Performed by: EMERGENCY MEDICINE

## 2025-04-11 PROCEDURE — 93010 ELECTROCARDIOGRAM REPORT: CPT | Performed by: EMERGENCY MEDICINE

## 2025-04-11 PROCEDURE — 85610 PROTHROMBIN TIME: CPT | Performed by: EMERGENCY MEDICINE

## 2025-04-11 PROCEDURE — 250N000011 HC RX IP 250 OP 636: Performed by: EMERGENCY MEDICINE

## 2025-04-11 PROCEDURE — 70450 CT HEAD/BRAIN W/O DYE: CPT

## 2025-04-11 PROCEDURE — 93005 ELECTROCARDIOGRAM TRACING: CPT | Performed by: EMERGENCY MEDICINE

## 2025-04-11 PROCEDURE — 99284 EMERGENCY DEPT VISIT MOD MDM: CPT | Performed by: EMERGENCY MEDICINE

## 2025-04-11 PROCEDURE — 80048 BASIC METABOLIC PNL TOTAL CA: CPT | Performed by: EMERGENCY MEDICINE

## 2025-04-11 PROCEDURE — 99207 PR NO CHARGE LOS: CPT | Performed by: NURSE PRACTITIONER

## 2025-04-11 PROCEDURE — 84484 ASSAY OF TROPONIN QUANT: CPT | Performed by: EMERGENCY MEDICINE

## 2025-04-11 PROCEDURE — 96361 HYDRATE IV INFUSION ADD-ON: CPT | Performed by: EMERGENCY MEDICINE

## 2025-04-11 PROCEDURE — 85041 AUTOMATED RBC COUNT: CPT | Performed by: EMERGENCY MEDICINE

## 2025-04-11 PROCEDURE — 96374 THER/PROPH/DIAG INJ IV PUSH: CPT | Mod: 59 | Performed by: EMERGENCY MEDICINE

## 2025-04-11 PROCEDURE — 99285 EMERGENCY DEPT VISIT HI MDM: CPT | Mod: 25 | Performed by: EMERGENCY MEDICINE

## 2025-04-11 PROCEDURE — 250N000009 HC RX 250: Performed by: NURSE PRACTITIONER

## 2025-04-11 PROCEDURE — 85730 THROMBOPLASTIN TIME PARTIAL: CPT | Performed by: EMERGENCY MEDICINE

## 2025-04-11 PROCEDURE — 36415 COLL VENOUS BLD VENIPUNCTURE: CPT | Performed by: EMERGENCY MEDICINE

## 2025-04-11 PROCEDURE — 96375 TX/PRO/DX INJ NEW DRUG ADDON: CPT | Performed by: EMERGENCY MEDICINE

## 2025-04-11 PROCEDURE — 250N000011 HC RX IP 250 OP 636: Performed by: NURSE PRACTITIONER

## 2025-04-11 RX ORDER — IOPAMIDOL 755 MG/ML
500 INJECTION, SOLUTION INTRAVASCULAR ONCE
Status: COMPLETED | OUTPATIENT
Start: 2025-04-11 | End: 2025-04-11

## 2025-04-11 RX ORDER — LORAZEPAM 2 MG/ML
1 INJECTION INTRAMUSCULAR ONCE
Status: COMPLETED | OUTPATIENT
Start: 2025-04-11 | End: 2025-04-11

## 2025-04-11 RX ORDER — KETOROLAC TROMETHAMINE 15 MG/ML
15 INJECTION, SOLUTION INTRAMUSCULAR; INTRAVENOUS ONCE
Status: COMPLETED | OUTPATIENT
Start: 2025-04-11 | End: 2025-04-11

## 2025-04-11 RX ORDER — DIPHENHYDRAMINE HYDROCHLORIDE 50 MG/ML
25 INJECTION, SOLUTION INTRAMUSCULAR; INTRAVENOUS ONCE
Status: COMPLETED | OUTPATIENT
Start: 2025-04-11 | End: 2025-04-11

## 2025-04-11 RX ADMIN — KETOROLAC TROMETHAMINE 15 MG: 15 INJECTION, SOLUTION INTRAMUSCULAR; INTRAVENOUS at 13:23

## 2025-04-11 RX ADMIN — IOPAMIDOL 67 ML: 755 INJECTION, SOLUTION INTRAVENOUS at 15:14

## 2025-04-11 RX ADMIN — SODIUM CHLORIDE 1000 ML: 9 INJECTION, SOLUTION INTRAVENOUS at 13:19

## 2025-04-11 RX ADMIN — SODIUM CHLORIDE 100 ML: 9 INJECTION, SOLUTION INTRAVENOUS at 15:14

## 2025-04-11 RX ADMIN — DIPHENHYDRAMINE HYDROCHLORIDE 25 MG: 50 INJECTION, SOLUTION INTRAMUSCULAR; INTRAVENOUS at 13:21

## 2025-04-11 RX ADMIN — LORAZEPAM 1 MG: 2 INJECTION INTRAMUSCULAR; INTRAVENOUS at 13:51

## 2025-04-11 RX ADMIN — PROCHLORPERAZINE EDISYLATE 10 MG: 5 INJECTION INTRAMUSCULAR; INTRAVENOUS at 13:25

## 2025-04-11 ASSESSMENT — ACTIVITIES OF DAILY LIVING (ADL)
ADLS_ACUITY_SCORE: 41

## 2025-04-11 ASSESSMENT — COLUMBIA-SUICIDE SEVERITY RATING SCALE - C-SSRS
1. IN THE PAST MONTH, HAVE YOU WISHED YOU WERE DEAD OR WISHED YOU COULD GO TO SLEEP AND NOT WAKE UP?: NO
2. HAVE YOU ACTUALLY HAD ANY THOUGHTS OF KILLING YOURSELF IN THE PAST MONTH?: NO
6. HAVE YOU EVER DONE ANYTHING, STARTED TO DO ANYTHING, OR PREPARED TO DO ANYTHING TO END YOUR LIFE?: NO
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
## 2025-04-11 NOTE — ED PROVIDER NOTES
History     Chief Complaint   Patient presents with    Eye Problem     HPI  Nehemias Dillon is a 60 year old male who presents to the emergency department secondary to visual disturbance and headache.  Patient was driving home yesterday when he thought he had water in his glasses due to the fairly abrupt onset of central visual blurriness.  Tried wiping his glasses and realized it was his vision.  His peripheral vision was pretty good compared to his central vision.  The symptoms lasted like this for at least an hour.  Patient was seen by an eye doctor and eye pathology was ruled out.  It was noted that he had high blood pressure at the eye doctor yesterday.  He was advised to be seen in the urgent care or ER.  He went to urgent care today and they advised him to go to the emergency department.  Later yesterday evening after the visual disturbance started and then improved patient developed a frontal headache.  Initially had a little bit of an occipital headache prior to the visual disturbance and then afterwards had a fairly significant frontal headache.  It is bitemporal and is associated with some light sensitivity but no nausea.  No history of migraines.  No trauma recently.  Patient is much better now.  He never had a focal visual field cut.  He did not have any new ataxia or difficulty with coordination.  He did feel little bit off balance when he got up today to go to the bathroom while here in the department.  Patient was able to walk independently to the bathroom without assistance.  He has not had any chest pain or palpitations associated with his symptoms.  No shortness of breath.  No vertiginous symptoms. he notes that previously he was seen here after they thought he was having heart attack because of chest discomfort and shortness of breath and it turned out he was having a panic attack.  He had a negative stress test.    Allergies:  Allergies   Allergen Reactions    Bees [Bees]     Nkda [No Known Drug  Allergy]        Problem List:    Patient Active Problem List    Diagnosis Date Noted    Diverticulitis of sigmoid colon 06/06/2012     Priority: High    Diverticulosis of large intestine without hemorrhage 04/21/2022     Priority: Medium    Morbid obesity (H) 04/21/2022     Priority: Medium    History of diverticulosis 10/31/2017     Priority: Medium    External hemorrhoids 10/31/2017     Priority: Medium    H/O partial resection of colon 10/31/2017     Priority: Medium    Sebaceous cyst 10/31/2017     Priority: Medium    TOIR (obstructive sleep apnea) 10/31/2017     Priority: Medium    Inguinal hernia, left 10/31/2017     Priority: Medium    Seasonal allergic rhinitis, unspecified chronicity, unspecified trigger 10/31/2017     Priority: Medium    Acute chest pain 08/30/2016     Priority: Medium    Tobacco use disorder 08/30/2016     Priority: Medium    Hidradenitis suppurativa 12/21/2012     Priority: Medium    Suppurative hidradenitis 12/21/2012     Priority: Medium    Diverticulitis of colon 06/25/2012     Priority: Medium    Hyperlipidemia LDL goal <130 03/11/2011     Priority: Medium    Allergic to bees 05/06/2009     Priority: Medium    Obstructive sleep apnea 05/01/2009     Priority: Medium    Carpal tunnel syndrome 03/24/2009     Priority: Medium     (Problem list name updated by automated process. Provider to review and confirm.)      Sleep disorder 03/24/2009     Priority: Medium    Class 2 obesity due to excess calories without serious comorbidity with body mass index (BMI) of 39.0 to 39.9 in adult 03/24/2009     Priority: Medium    Pure hyperglyceridemia 02/04/2005     Priority: Medium    Other and unspecified derangement of medial meniscus 07/05/2002     Priority: Medium    Inguinal hernia 06/06/2012     Priority: Low    Anxiety 07/15/2009     Priority: Low        Past Medical History:    Past Medical History:   Diagnosis Date    Diverticulitis     DM type 2 (diabetes mellitus, type 2) (H)      Hypertriglyceridemia     Lumbago     TORI (obstructive sleep apnea) 10/31/2017    Prostatitis, unspecified        Past Surgical History:    Past Surgical History:   Procedure Laterality Date    ARTHROPLASTY SHOULDER      right    CARPAL TUNNEL RELEASE RT/LT  2012    left    COLONOSCOPY N/A 12/12/2017    Procedure: COLONOSCOPY;  colonoscopy, internal hemorrhoid banding, excision scalp mass;  Surgeon: Reilly Finney MD;  Location: [facility]    EXCISE MASS HEAD N/A 12/12/2017    Procedure: EXCISE MASS HEAD;;  Surgeon: Reilly Finney MD;  Location: [facility]    HC KNEE SCOPE,MED/LAT MENISECTOMY  07/08/2002    Right knee arthroscopy, lateral menisectomy    HC REPAIR ING HERNIA,5+Y/O,REDUCIBL  1997    Marlex mesh repair of right indirect inguinal hernia    HEMORRHOIDECTOMY BANDING N/A 12/12/2017    Procedure: HEMORRHOIDECTOMY BANDING;;  Surgeon: Reilly Finney MD;  Location:  GI    SIGMOIDECTOMY         Family History:    Family History   Adopted: Yes   Problem Relation Age of Onset    Unknown/Adopted Mother     Unknown/Adopted Father     Unknown/Adopted Maternal Grandmother     Unknown/Adopted Maternal Grandfather     Unknown/Adopted Paternal Grandmother     Unknown/Adopted Paternal Grandfather     Unknown/Adopted Brother     Unknown/Adopted Sister        Social History:  Marital Status:   [2]  Social History     Tobacco Use    Smoking status: Every Day     Current packs/day: 1.50     Average packs/day: 1.5 packs/day for 20.0 years (30.0 ttl pk-yrs)     Types: Cigarettes     Passive exposure: Current    Smokeless tobacco: Never    Tobacco comments:     smokes 1 pack daily again since 6-09   Vaping Use    Vaping status: Never Used   Substance Use Topics    Alcohol use: Yes     Comment: one drink rarely    Drug use: No        Medications:    acetaminophen (TYLENOL) 325 MG tablet  atorvastatin (LIPITOR) 20 MG tablet          Review of Systems   All other systems reviewed and are negative.      Physical Exam   BP:  "115/89  Pulse: 78  Temp: 98  F (36.7  C)  Resp: 14  Height: 167.6 cm (5' 6\")  Weight: 107 kg (236 lb)  SpO2: 98 %      Physical Exam  Vitals and nursing note reviewed.   Constitutional:       General: He is not in acute distress.     Appearance: He is well-developed. He is not diaphoretic.   HENT:      Head: Normocephalic and atraumatic.      Nose: Nose normal. No congestion.      Mouth/Throat:      Mouth: Mucous membranes are moist.      Pharynx: Oropharynx is clear.   Eyes:      General: No scleral icterus.     Extraocular Movements: Extraocular movements intact.      Conjunctiva/sclera: Conjunctivae normal.      Pupils: Pupils are equal, round, and reactive to light.   Cardiovascular:      Rate and Rhythm: Normal rate.   Pulmonary:      Effort: Pulmonary effort is normal.   Abdominal:      General: Abdomen is flat.   Musculoskeletal:         General: No tenderness or deformity. Normal range of motion.      Cervical back: Normal range of motion and neck supple.   Lymphadenopathy:      Cervical: No cervical adenopathy.   Skin:     General: Skin is warm and dry.      Capillary Refill: Capillary refill takes less than 2 seconds.      Findings: No rash.   Neurological:      General: No focal deficit present.      Mental Status: He is alert and oriented to person, place, and time.      Cranial Nerves: No cranial nerve deficit.      Sensory: No sensory deficit.      Coordination: Coordination normal.      Comments: No ataxia, no difficulty with coordination, normal extraocular movements, no visual field cuts, normal strength, normal sensation.   Psychiatric:         Mood and Affect: Mood normal.         Behavior: Behavior normal.         ED Course        Procedures              EKG Interpretation:      Interpreted by Luke Ortiz MD  Time reviewed: 1237  Symptoms at time of EKG: visual disturbance, stroke work up    Rhythm: normal sinus   Rate: normal  Axis: normal  Ectopy: none  Conduction: normal  ST Segments/ T " Waves: No ST-T wave changes  Q Waves: none  Comparison to prior: Unchanged    Clinical Impression: normal EKG      The patient has stroke symptoms:         ED Stroke specific documentation           NIHSS PDF     Patient last known well time: yesterday afternoon   ED Provider first to bedside at: 1300  CT Results received at: 1530    Thrombolytics:   Not given due to:   - stroke mimic: migraine favored     If treating with thrombolytics: Ensure SBP<180 and DBP<105 prior to treatment with thrombolytics.  Administering thrombolytics after treatment with IV labetalol, hydralazine, or nicardipine is reasonable once BP control is established.    Endovascular Retrieval:  Not initiated due to absence of proximal vessel occlusion    National Institutes of [employer] Stroke Scale (Baseline)  Time Performed: 1305     Score    Level of consciousness: (0)   Alert, keenly responsive    LOC questions: (0)   Answers both questions correctly    LOC commands: (0)   Performs both tasks correctly    Best gaze: (0)   Normal    Visual: (0)   No visual loss    Facial palsy: (0)   Normal symmetrical movements    Motor arm (left): (0)   No drift    Motor arm (right): (0)   No drift    Motor leg (left): (0)   No drift    Motor leg (right): (0)   No drift    Limb ataxia: (0)   Absent    Sensory: (0)   Normal- no sensory loss    Best language: (0)   Normal- no aphasia    Dysarthria: (0)   Normal    Extinction and inattention: (0)   No abnormality        Total Score:  0        Stroke Mimics were considered (including migraine headache, seizure disorder, hypoglycemia (or hyperglycemia), head or spinal trauma, CNS infection, Toxin ingestion and shock state (e.g. sepsis) .             Results for orders placed or performed during the hospital encounter of 04/11/25 (from the past 24 hours)   Keno Draw    Narrative    The following orders were created for panel order [facility]o Draw.  Procedure                               Abnormality         Status                      ---------                               -----------         ------                     Extra Green Top (Lithiu...[[id number]]                      Final result               Extra Purple Top Tube[[id number]]                           Final result                 Please view results for these tests on the individual orders.   Extra Green Top (Lithium Heparin) Tube   Result Value Ref Range    Hold Specimen JIC    Extra Purple Top Tube   Result Value Ref Range    Hold Specimen JIC    CBC with Platelets & Differential    Narrative    The following orders were created for panel order CBC with Platelets & Differential.  Procedure                               Abnormality         Status                     ---------                               -----------         ------                     CBC with platelets and ...[[id number]]                      Final result                 Please view results for these tests on the individual orders.   Basic metabolic panel   Result Value Ref Range    Sodium 140 135 - 145 mmol/L    Potassium 4.5 3.4 - 5.3 mmol/L    Chloride 104 98 - 107 mmol/L    Carbon Dioxide (CO2) 24 22 - 29 mmol/L    Anion Gap 12 7 - 15 mmol/L    Urea Nitrogen 15.8 6.0 - 20.0 mg/dL    Creatinine 0.95 0.67 - 1.17 mg/dL    GFR Estimate >90 >60 mL/min/1.73m2    Calcium 9.4 8.8 - 10.4 mg/dL    Glucose 105 (H) 70 - 99 mg/dL   Troponin T, High Sensitivity   Result Value Ref Range    Troponin T, High Sensitivity 6 <=22 ng/L   CBC with platelets and differential   Result Value Ref Range    WBC Count 8.8 4.0 - 11.0 10e3/uL    RBC Count 5.18 4.40 - 5.90 10e6/uL    Hemoglobin 16.1 13.3 - 17.7 g/dL    Hematocrit 46.2 40.0 - 53.0 %    MCV 89 78 - 100 fL    MCH 31.1 26.5 - 33.0 pg    MCHC 34.8 31.5 - 36.5 g/dL    RDW 12.7 10.0 - 15.0 %    Platelet Count 276 150 - 450 10e3/uL    % Neutrophils 65 %    % Lymphocytes 24 %    % Monocytes 9 %    % Eosinophils 2 %    % Basophils 1 %    % Immature Granulocytes 0 %     NRBCs per 100 WBC 0 <1 /100    Absolute Neutrophils 5.8 1.6 - 8.3 10e3/uL    Absolute Lymphocytes 2.1 0.8 - 5.3 10e3/uL    Absolute Monocytes 0.8 0.0 - 1.3 10e3/uL    Absolute Eosinophils 0.1 0.0 - 0.7 10e3/uL    Absolute Basophils 0.0 0.0 - 0.2 10e3/uL    Absolute Immature Granulocytes 0.0 <=0.4 10e3/uL    Absolute NRBCs 0.0 10e3/uL   EKG 12-lead, tracing only   Result Value Ref Range    Systolic Blood Pressure  mmHg    Diastolic Blood Pressure  mmHg    Ventricular Rate 67 BPM    Atrial Rate 67 BPM    AK Interval 168 ms    QRS Duration 86 ms     ms    QTc 467 ms    P Axis 47 degrees    R AXIS 45 degrees    T Axis 23 degrees    Interpretation ECG       Sinus rhythm  Normal ECG  No previous ECGs available  Confirmed by SEE ED PROVIDER NOTE FOR, ECG INTERPRETATION ([id number]),  Melanie Barnard ([id number]) on 4/11/2025 1:16:14 PM     INR   Result Value Ref Range    INR 1.07 0.85 - 1.15   Partial thromboplastin time   Result Value Ref Range    aPTT 29 22 - 38 Seconds   CT Head w/o Contrast    Narrative    EXAM: CT HEAD W/O CONTRAST, CTA HEAD NECK W CONTRAST  LOCATION: [facility]  DATE: 4/11/2025    INDICATION: Binocular visual disturbance, now resolved  COMPARISON: None  CONTRAST: Isovue 370,  67 mL  TECHNIQUE: Head and neck CT angiogram with IV contrast. Noncontrast head CT followed by axial helical CT images of the head and neck vessels obtained during the arterial phase of intravenous contrast administration. Axial 2D reconstructed images and   multiplanar 3D MIP reconstructed images of the head and neck vessels were performed by the technologist. Dose reduction techniques were used. All stenosis measurements made according to NASCET criteria unless otherwise specified.    FINDINGS:   NONCONTRAST HEAD CT:   INTRACRANIAL CONTENTS: No acute intracranial hemorrhage, extraaxial collection, or mass effect. No evidence of an acute transcortical confluent infarct. Normal parenchymal  attenuation. Normal ventricles and sulci for age.     VISUALIZED ORBITS/SINUSES/MASTOIDS: No acute intraorbital finding. No significant paranasal sinus or mastoid mucosal disease.     BONES/SOFT TISSUES: No acute abnormality.    HEAD CTA:  ANTERIOR CIRCULATION: No high-grade stenosis, occlusion, aneurysm, or high-flow vascular malformation. Standard [place] anatomy.    POSTERIOR CIRCULATION: No high-grade stenosis, occlusion, aneurysm, or high-flow vascular malformation. A congenitally smaller right vertebral artery.     DURAL VENOUS SINUSES: Expected enhancement of the major dural venous sinuses. Please note that this exam is not specifically tailored for the assessment of the intracranial venous structures.    NECK CTA:  RIGHT CAROTID: No hemodynamically significant stenosis or dissection.    LEFT CAROTID: No hemodynamically significant stenosis or dissection.    VERTEBRAL ARTERIES: No focal high-grade stenosis or dissection. A congenitally smaller right vertebral artery.    AORTIC ARCH: Classic aortic arch anatomy with no significant stenosis at the origin of the great vessels.    NONVASCULAR STRUCTURES: No significant finding.      Impression    IMPRESSION:   HEAD CT:  1.  No acute intracranial abnormality.    HEAD CTA:   1.  No large vessel occlusion, high-grade stenosis, aneurysm, or high-flow vascular malformation.    NECK CTA:  1.  No hemodynamically significant stenosis or dissection in the neck vessels.   CTA Head Neck with Contrast    Narrative    EXAM: CT HEAD W/O CONTRAST, CTA HEAD NECK W CONTRAST  LOCATION: [facility]  DATE: 4/11/2025    INDICATION: Binocular visual disturbance, now resolved  COMPARISON: None  CONTRAST: Isovue 370,  67 mL  TECHNIQUE: Head and neck CT angiogram with IV contrast. Noncontrast head CT followed by axial helical CT images of the head and neck vessels obtained during the arterial phase of intravenous contrast administration. Axial 2D  reconstructed images and   multiplanar 3D MIP reconstructed images of the head and neck vessels were performed by the technologist. Dose reduction techniques were used. All stenosis measurements made according to NASCET criteria unless otherwise specified.    FINDINGS:   NONCONTRAST HEAD CT:   INTRACRANIAL CONTENTS: No acute intracranial hemorrhage, extraaxial collection, or mass effect. No evidence of an acute transcortical confluent infarct. Normal parenchymal attenuation. Normal ventricles and sulci for age.     VISUALIZED ORBITS/SINUSES/MASTOIDS: No acute intraorbital finding. No significant paranasal sinus or mastoid mucosal disease.     BONES/SOFT TISSUES: No acute abnormality.    HEAD CTA:  ANTERIOR CIRCULATION: No high-grade stenosis, occlusion, aneurysm, or high-flow vascular malformation. Standard [place] anatomy.    POSTERIOR CIRCULATION: No high-grade stenosis, occlusion, aneurysm, or high-flow vascular malformation. A congenitally smaller right vertebral artery.     DURAL VENOUS SINUSES: Expected enhancement of the major dural venous sinuses. Please note that this exam is not specifically tailored for the assessment of the intracranial venous structures.    NECK CTA:  RIGHT CAROTID: No hemodynamically significant stenosis or dissection.    LEFT CAROTID: No hemodynamically significant stenosis or dissection.    VERTEBRAL ARTERIES: No focal high-grade stenosis or dissection. A congenitally smaller right vertebral artery.    AORTIC ARCH: Classic aortic arch anatomy with no significant stenosis at the origin of the great vessels.    NONVASCULAR STRUCTURES: No significant finding.      Impression    IMPRESSION:   HEAD CT:  1.  No acute intracranial abnormality.    HEAD CTA:   1.  No large vessel occlusion, high-grade stenosis, aneurysm, or high-flow vascular malformation.    NECK CTA:  1.  No hemodynamically significant stenosis or dissection in the neck vessels.       Medications   diphenhydrAMINE  (BENADRYL) injection 25 mg (25 mg Intravenous $Given 4/11/25 1321)   prochlorperazine (COMPAZINE) injection 10 mg (10 mg Intravenous $Given 4/11/25 1325)   ketorolac (TORADOL) injection 15 mg (15 mg Intravenous $Given 4/11/25 1323)   sodium chloride 0.9% BOLUS 1,000 mL (0 mLs Intravenous Stopped 4/11/25 1500)   iopamidol (ISOVUE-370) solution 500 mL (67 mLs Intravenous $Given 4/11/25 1514)   sodium chloride 0.9 % bag for CT scan flush (100 mLs Intravenous $Given 4/11/25 1514)   LORazepam (ATIVAN) injection 1 mg (1 mg Intravenous $Given 4/11/25 1351)       Assessments & Plan (with Medical Decision Making)  60-year-old male who presented to the emergency department secondary to visual disturbance followed by headache.  There is concern for posterior circulation stroke.  Patient has a headache 5 out of 10 bitemporal at this time.  He has also had some photophobia.  He does have a history of hyperglycemia, hypertriglyceridemia, obesity, high blood pressure when seen at the eye doctor all which raise concerns for cerebrovascular accident.  Discussed with stroke neurology who agreed with workup for CVA with concurrent treatment for migraine.  Migraine favored.  Patient was given IV Toradol Compazine Benadryl and IV fluids.  They recommended CT CTA and MRI.  Stroke code order set was placed.  Patient and family in agreement with this plan.  Patient was given migraine cocktail and an MRI of the brain was attempted.  Patient became extremely claustrophobic and anxious and had a panic attack and ran out of MRI and was wanting to leave.  After negotiation with his nurse we went ahead and gave the patient 1 mg of Ativan which was highly successful in helping him to relax.  On reevaluation patient's headache was completely gone and there was no visual disturbance ongoing.  He felt tired and was ready to go home and sleep.  I had a long discussion with him and his family regarding that without an MRI we could not be 100% certain  that he did not have a CVA.  We discussed the fact that we do not do sedation MRIs here at this hospital but if his symptoms returned we could try giving him Ativan and seeing if we can get an MRI done versus transferring him to a conscious sedation MRI friendly hospital.  Patient understands and agrees.  He would like to go home rather than transfer for sedation MRI.  I think that is reasonable.  Most likely diagnosis is migraine.  Return to ER precautions and follow-up precautions discussed.  All questions answered prior to discharge.     I have reviewed the nursing notes.    I have reviewed the findings, diagnosis, plan and need for follow up with the patient.          New Prescriptions    No medications on file       Final diagnoses:   Migraine without status migrainosus, not intractable, unspecified migraine type       4/11/2025   [facility] EMERGENCY DEPT       Luke Ortiz MD  04/11/25 1453

## 2025-04-11 NOTE — CONSULTS
"  [facility]    Stroke Telephone Note    I was called by Luke Ortiz on 04/11/25 regarding patient Nehemias Dillon. The patient is a 60 year old male with PMH of TORI, HLD, tobacco use who presents for visual disturbance and headache. LKW reported as yesterday afternoon when he had onset of binocular visual blurriness that seemed to affect the central vision>peripheral vision; lasted about 60 minutes and then he got a moderate headache. No further eye complaints but headache is persisting. Reported to have seen eye provider today who told him he had normal eye exam but that BP was high so he should seek further evaluation (BP now normal). Per ED provider patient has no deficits on exam.     Vitals  BP: 115/89   Pulse: 78   Resp: 14   Temp: 98  F (36.7  C)   Weight: 107 kg (236 lb)    Imaging Findings  pending    Impression  Binocular visual disturbance and headache. Elevated suspicion for migraine with visual aura based on binocular involvement with subsequent headache, however for a first lifetime event in a patient with vascular risk factors, vascular/alternative etiology needs to be excluded    Recommendations  -brain MRI  -head/neck CTA  -migraine cocktail per ED    If imaging is negative/normal and symptoms resolve with migraine cocktail, anticipate patient will be able to discharge with outpatient neurology following     Case discussed with vascular neurology attending Dr. Valenzuela.    My recommendations are based on the information provided over the phone by Nehemias Dillon's in-person providers. They are not intended to replace the clinical judgment of his in-person providers. I was not requested to personally see or examine the patient at this time.     CARIE Bolton CNP  Vascular Neurology    To page me or covering stroke neurology team member, click here: [email or web address]  Choose \"On Call\" tab at top, then select \"NEUROLOGY/ALL SITES\" from middle drop-down box, press Enter, " "then look for \"stroke\" or \"telestroke\" for your site.   "

## 2025-04-11 NOTE — DISCHARGE INSTRUCTIONS
As discussed I think it is likely that you have had a migraine headache.  Hopefully this resolves your symptoms.  If symptoms return return to the emergency department or if things worsen please return to the emergency department for reevaluation.  As discussed we cannot be 100% certain there was no stroke without having an MRI but it was difficult for you to tolerate the MRI.  It was a pleasure to meet you.

## 2025-06-14 ENCOUNTER — HEALTH MAINTENANCE LETTER (OUTPATIENT)
Age: 60
End: 2025-06-14

## 2025-08-11 ENCOUNTER — OFFICE VISIT (OUTPATIENT)
Dept: AUDIOLOGY | Facility: CLINIC | Age: 60
End: 2025-08-11
Payer: COMMERCIAL

## 2025-08-11 ENCOUNTER — OFFICE VISIT (OUTPATIENT)
Dept: FAMILY MEDICINE | Facility: OTHER | Age: 60
End: 2025-08-11
Payer: COMMERCIAL

## 2025-08-11 VITALS
HEIGHT: 67 IN | WEIGHT: 244 LBS | OXYGEN SATURATION: 95 % | HEART RATE: 82 BPM | RESPIRATION RATE: 16 BRPM | DIASTOLIC BLOOD PRESSURE: 80 MMHG | TEMPERATURE: 97.5 F | BODY MASS INDEX: 38.3 KG/M2 | SYSTOLIC BLOOD PRESSURE: 132 MMHG

## 2025-08-11 DIAGNOSIS — Z12.11 SPECIAL SCREENING FOR MALIGNANT NEOPLASMS, COLON: ICD-10-CM

## 2025-08-11 DIAGNOSIS — L91.8 SKIN TAG: ICD-10-CM

## 2025-08-11 DIAGNOSIS — Z00.00 ROUTINE GENERAL MEDICAL EXAMINATION AT A HEALTH CARE FACILITY: Primary | ICD-10-CM

## 2025-08-11 DIAGNOSIS — M21.42 FLAT FEET: ICD-10-CM

## 2025-08-11 DIAGNOSIS — H90.3 SENSORINEURAL HEARING LOSS (SNHL) OF BOTH EARS: Primary | ICD-10-CM

## 2025-08-11 DIAGNOSIS — F41.9 ANXIETY: ICD-10-CM

## 2025-08-11 DIAGNOSIS — M21.41 FLAT FEET: ICD-10-CM

## 2025-08-11 DIAGNOSIS — R73.9 ELEVATED RANDOM BLOOD GLUCOSE LEVEL: ICD-10-CM

## 2025-08-11 DIAGNOSIS — L05.91 PILONIDAL CYST: ICD-10-CM

## 2025-08-11 DIAGNOSIS — H91.90 HEARING LOSS, UNSPECIFIED HEARING LOSS TYPE, UNSPECIFIED LATERALITY: ICD-10-CM

## 2025-08-11 DIAGNOSIS — Z87.891 PERSONAL HISTORY OF TOBACCO USE: ICD-10-CM

## 2025-08-11 LAB
ANION GAP SERPL CALCULATED.3IONS-SCNC: 13 MMOL/L (ref 7–15)
BUN SERPL-MCNC: 18.5 MG/DL (ref 6–20)
CALCIUM SERPL-MCNC: 9.4 MG/DL (ref 8.8–10.4)
CHLORIDE SERPL-SCNC: 104 MMOL/L (ref 98–107)
CHOLEST SERPL-MCNC: 201 MG/DL
CREAT SERPL-MCNC: 0.98 MG/DL (ref 0.67–1.17)
EGFRCR SERPLBLD CKD-EPI 2021: 89 ML/MIN/1.73M2
ERYTHROCYTE [DISTWIDTH] IN BLOOD BY AUTOMATED COUNT: 12.7 % (ref 10–15)
FASTING STATUS PATIENT QL REPORTED: YES
FASTING STATUS PATIENT QL REPORTED: YES
GLUCOSE SERPL-MCNC: 129 MG/DL (ref 70–99)
HCO3 SERPL-SCNC: 22 MMOL/L (ref 22–29)
HCT VFR BLD AUTO: 47.9 % (ref 40–53)
HDLC SERPL-MCNC: 22 MG/DL
HGB BLD-MCNC: 16.5 G/DL (ref 13.3–17.7)
LDLC SERPL CALC-MCNC: 105 MG/DL
MCH RBC QN AUTO: 31.4 PG (ref 26.5–33)
MCHC RBC AUTO-ENTMCNC: 34.4 G/DL (ref 31.5–36.5)
MCV RBC AUTO: 91 FL (ref 78–100)
NONHDLC SERPL-MCNC: 179 MG/DL
PLATELET # BLD AUTO: 272 10E3/UL (ref 150–450)
POTASSIUM SERPL-SCNC: 5.1 MMOL/L (ref 3.4–5.3)
RBC # BLD AUTO: 5.26 10E6/UL (ref 4.4–5.9)
SODIUM SERPL-SCNC: 139 MMOL/L (ref 135–145)
TRIGL SERPL-MCNC: 369 MG/DL
WBC # BLD AUTO: 9.3 10E3/UL (ref 4–11)

## 2025-08-11 PROCEDURE — 90715 TDAP VACCINE 7 YRS/> IM: CPT | Performed by: PHYSICIAN ASSISTANT

## 2025-08-11 PROCEDURE — 80048 BASIC METABOLIC PNL TOTAL CA: CPT | Performed by: PHYSICIAN ASSISTANT

## 2025-08-11 PROCEDURE — 90471 IMMUNIZATION ADMIN: CPT | Performed by: PHYSICIAN ASSISTANT

## 2025-08-11 PROCEDURE — 99396 PREV VISIT EST AGE 40-64: CPT | Mod: 25 | Performed by: PHYSICIAN ASSISTANT

## 2025-08-11 PROCEDURE — 80061 LIPID PANEL: CPT | Performed by: PHYSICIAN ASSISTANT

## 2025-08-11 PROCEDURE — 85027 COMPLETE CBC AUTOMATED: CPT | Performed by: PHYSICIAN ASSISTANT

## 2025-08-11 PROCEDURE — 99214 OFFICE O/P EST MOD 30 MIN: CPT | Mod: 25 | Performed by: PHYSICIAN ASSISTANT

## 2025-08-11 PROCEDURE — 36415 COLL VENOUS BLD VENIPUNCTURE: CPT | Performed by: PHYSICIAN ASSISTANT

## 2025-08-11 PROCEDURE — 92557 COMPREHENSIVE HEARING TEST: CPT | Performed by: AUDIOLOGIST

## 2025-08-11 PROCEDURE — 92550 TYMPANOMETRY & REFLEX THRESH: CPT | Performed by: AUDIOLOGIST

## 2025-08-11 PROCEDURE — G0296 VISIT TO DETERM LDCT ELIG: HCPCS | Performed by: PHYSICIAN ASSISTANT

## 2025-08-11 PROCEDURE — 83036 HEMOGLOBIN GLYCOSYLATED A1C: CPT | Performed by: PHYSICIAN ASSISTANT

## 2025-08-11 RX ORDER — HYDROXYZINE HYDROCHLORIDE 10 MG/1
10-20 TABLET, FILM COATED ORAL 3 TIMES DAILY PRN
Qty: 60 TABLET | Refills: 2 | Status: SHIPPED | OUTPATIENT
Start: 2025-08-11

## 2025-08-11 SDOH — HEALTH STABILITY: PHYSICAL HEALTH: ON AVERAGE, HOW MANY DAYS PER WEEK DO YOU ENGAGE IN MODERATE TO STRENUOUS EXERCISE (LIKE A BRISK WALK)?: 0 DAYS

## 2025-08-11 SDOH — HEALTH STABILITY: PHYSICAL HEALTH: ON AVERAGE, HOW MANY MINUTES DO YOU ENGAGE IN EXERCISE AT THIS LEVEL?: 0 MIN

## 2025-08-11 ASSESSMENT — SOCIAL DETERMINANTS OF HEALTH (SDOH): HOW OFTEN DO YOU GET TOGETHER WITH FRIENDS OR RELATIVES?: PATIENT DECLINED

## 2025-08-12 ENCOUNTER — TELEPHONE (OUTPATIENT)
Dept: GASTROENTEROLOGY | Facility: CLINIC | Age: 60
End: 2025-08-12
Payer: COMMERCIAL

## 2025-08-12 ENCOUNTER — PATIENT OUTREACH (OUTPATIENT)
Dept: CARE COORDINATION | Facility: CLINIC | Age: 60
End: 2025-08-12
Payer: COMMERCIAL

## 2025-08-12 LAB
EST. AVERAGE GLUCOSE BLD GHB EST-MCNC: 131 MG/DL
HBA1C MFR BLD: 6.2 % (ref 0–5.6)

## 2025-08-14 ENCOUNTER — PATIENT OUTREACH (OUTPATIENT)
Dept: CARE COORDINATION | Facility: CLINIC | Age: 60
End: 2025-08-14
Payer: COMMERCIAL

## 2025-08-15 ENCOUNTER — TELEPHONE (OUTPATIENT)
Dept: SURGERY | Facility: CLINIC | Age: 60
End: 2025-08-15

## 2025-08-15 ENCOUNTER — OFFICE VISIT (OUTPATIENT)
Dept: SURGERY | Facility: CLINIC | Age: 60
End: 2025-08-15
Payer: COMMERCIAL

## (undated) DEVICE — PACK MINOR PROCEDURE CUSTOM

## (undated) DEVICE — SU VICRYL 3-0 SH 27" UND J416H

## (undated) DEVICE — DRAPE LAP W/ARMBOARD 29410

## (undated) DEVICE — PREP POVIDONE IODINE SOLUTION 10% 120ML

## (undated) DEVICE — SOL ADH LIQUID BENZOIN SWAB 0.6ML C1544

## (undated) DEVICE — PREP SKIN SCRUB TRAY 4461A

## (undated) DEVICE — ESU GROUND PAD UNIVERSAL W/O CORD

## (undated) DEVICE — TAPE MEDIPORE 3"

## (undated) DEVICE — TAPE ELASTIKON 3" LATEX 005175

## (undated) DEVICE — GLOVE PROTEXIS W/NEU-THERA 7.5  2D73TE75

## (undated) DEVICE — DRSG STERI STRIP 1/2X4" R1547

## (undated) RX ORDER — LIDOCAINE HYDROCHLORIDE 10 MG/ML
INJECTION, SOLUTION INFILTRATION; PERINEURAL
Status: DISPENSED
Start: 2017-12-12

## (undated) RX ORDER — EPINEPHRINE 1 MG/ML
INJECTION, SOLUTION, CONCENTRATE INTRAVENOUS
Status: DISPENSED
Start: 2017-12-12